# Patient Record
Sex: MALE | Race: WHITE | NOT HISPANIC OR LATINO | Employment: FULL TIME | URBAN - METROPOLITAN AREA
[De-identification: names, ages, dates, MRNs, and addresses within clinical notes are randomized per-mention and may not be internally consistent; named-entity substitution may affect disease eponyms.]

---

## 2018-11-30 ENCOUNTER — LAB (OUTPATIENT)
Dept: LAB | Facility: HOSPITAL | Age: 48
End: 2018-11-30

## 2018-11-30 ENCOUNTER — TRANSCRIBE ORDERS (OUTPATIENT)
Dept: LAB | Facility: HOSPITAL | Age: 48
End: 2018-11-30

## 2018-11-30 DIAGNOSIS — L40.50 PSORIATIC ARTHROPATHY (HCC): ICD-10-CM

## 2018-11-30 DIAGNOSIS — Z22.7 INACTIVE TUBERCULOSIS: ICD-10-CM

## 2018-11-30 DIAGNOSIS — R76.12 NONSPECIFIC REACTION TO CELL MEDIATED IMMUNITY MEASUREMENT OF GAMMA INTERFERON ANTIGEN RESPONSE WITHOUT ACTIVE TUBERCULOSIS: Primary | ICD-10-CM

## 2018-11-30 DIAGNOSIS — R76.12 NONSPECIFIC REACTION TO CELL MEDIATED IMMUNITY MEASUREMENT OF GAMMA INTERFERON ANTIGEN RESPONSE WITHOUT ACTIVE TUBERCULOSIS: ICD-10-CM

## 2018-11-30 LAB
ALBUMIN SERPL-MCNC: 4.52 G/DL (ref 3.2–4.8)
ALBUMIN/GLOB SERPL: 2.3 G/DL (ref 1.5–2.5)
ALP SERPL-CCNC: 76 U/L (ref 25–100)
ALT SERPL W P-5'-P-CCNC: 25 U/L (ref 7–40)
ANION GAP SERPL CALCULATED.3IONS-SCNC: 5 MMOL/L (ref 3–11)
AST SERPL-CCNC: 21 U/L (ref 0–33)
BASOPHILS # BLD AUTO: 0.1 10*3/MM3 (ref 0–0.2)
BASOPHILS NFR BLD AUTO: 1.5 % (ref 0–1)
BILIRUB SERPL-MCNC: 0.4 MG/DL (ref 0.3–1.2)
BUN BLD-MCNC: 9 MG/DL (ref 9–23)
BUN/CREAT SERPL: 10.6 (ref 7–25)
CALCIUM SPEC-SCNC: 9 MG/DL (ref 8.7–10.4)
CHLORIDE SERPL-SCNC: 100 MMOL/L (ref 99–109)
CO2 SERPL-SCNC: 29 MMOL/L (ref 20–31)
CREAT BLD-MCNC: 0.85 MG/DL (ref 0.6–1.3)
DEPRECATED RDW RBC AUTO: 45.6 FL (ref 37–54)
EOSINOPHIL # BLD AUTO: 0.24 10*3/MM3 (ref 0–0.3)
EOSINOPHIL NFR BLD AUTO: 3.7 % (ref 0–3)
ERYTHROCYTE [DISTWIDTH] IN BLOOD BY AUTOMATED COUNT: 15.4 % (ref 11.3–14.5)
GFR SERPL CREATININE-BSD FRML MDRD: 95 ML/MIN/1.73
GLOBULIN UR ELPH-MCNC: 2 GM/DL
GLUCOSE BLD-MCNC: 110 MG/DL (ref 70–100)
HCT VFR BLD AUTO: 38.4 % (ref 38.9–50.9)
HGB BLD-MCNC: 11.8 G/DL (ref 13.1–17.5)
IMM GRANULOCYTES # BLD: 0.01 10*3/MM3 (ref 0–0.03)
IMM GRANULOCYTES NFR BLD: 0.2 % (ref 0–0.6)
LYMPHOCYTES # BLD AUTO: 2.15 10*3/MM3 (ref 0.6–4.8)
LYMPHOCYTES NFR BLD AUTO: 33 % (ref 24–44)
MCH RBC QN AUTO: 24.9 PG (ref 27–31)
MCHC RBC AUTO-ENTMCNC: 30.7 G/DL (ref 32–36)
MCV RBC AUTO: 81.2 FL (ref 80–99)
MONOCYTES # BLD AUTO: 0.52 10*3/MM3 (ref 0–1)
MONOCYTES NFR BLD AUTO: 8 % (ref 0–12)
NEUTROPHILS # BLD AUTO: 3.5 10*3/MM3 (ref 1.5–8.3)
NEUTROPHILS NFR BLD AUTO: 53.6 % (ref 41–71)
PLATELET # BLD AUTO: 304 10*3/MM3 (ref 150–450)
PMV BLD AUTO: 10.7 FL (ref 6–12)
POTASSIUM BLD-SCNC: 4.2 MMOL/L (ref 3.5–5.5)
PROT SERPL-MCNC: 6.5 G/DL (ref 5.7–8.2)
RBC # BLD AUTO: 4.73 10*6/MM3 (ref 4.2–5.76)
SODIUM BLD-SCNC: 134 MMOL/L (ref 132–146)
WBC NRBC COR # BLD: 6.52 10*3/MM3 (ref 3.5–10.8)

## 2018-11-30 PROCEDURE — 85025 COMPLETE CBC W/AUTO DIFF WBC: CPT

## 2018-11-30 PROCEDURE — 80053 COMPREHEN METABOLIC PANEL: CPT

## 2018-11-30 PROCEDURE — 36415 COLL VENOUS BLD VENIPUNCTURE: CPT

## 2019-01-04 ENCOUNTER — TRANSCRIBE ORDERS (OUTPATIENT)
Dept: LAB | Facility: HOSPITAL | Age: 49
End: 2019-01-04

## 2019-01-04 ENCOUNTER — LAB (OUTPATIENT)
Dept: LAB | Facility: HOSPITAL | Age: 49
End: 2019-01-04

## 2019-01-04 DIAGNOSIS — R76.12 NONSPECIFIC REACTION TO CELL MEDIATED IMMUNITY MEASUREMENT OF GAMMA INTERFERON ANTIGEN RESPONSE WITHOUT ACTIVE TUBERCULOSIS: ICD-10-CM

## 2019-01-04 DIAGNOSIS — L40.50 PSORIATIC ARTHROPATHY (HCC): ICD-10-CM

## 2019-01-04 DIAGNOSIS — Z22.7 INACTIVE TUBERCULOSIS: ICD-10-CM

## 2019-01-04 DIAGNOSIS — D89.89 RADIATION CHIMERA (HCC): ICD-10-CM

## 2019-01-04 DIAGNOSIS — D89.89 RADIATION CHIMERA (HCC): Primary | ICD-10-CM

## 2019-01-04 LAB
ALBUMIN SERPL-MCNC: 4.27 G/DL (ref 3.2–4.8)
ALBUMIN/GLOB SERPL: 2.2 G/DL (ref 1.5–2.5)
ALP SERPL-CCNC: 66 U/L (ref 25–100)
ALT SERPL W P-5'-P-CCNC: 17 U/L (ref 7–40)
ANION GAP SERPL CALCULATED.3IONS-SCNC: 2 MMOL/L (ref 3–11)
AST SERPL-CCNC: 17 U/L (ref 0–33)
BASOPHILS # BLD AUTO: 0.1 10*3/MM3 (ref 0–0.2)
BASOPHILS NFR BLD AUTO: 1.7 % (ref 0–1)
BILIRUB SERPL-MCNC: 0.2 MG/DL (ref 0.3–1.2)
BUN BLD-MCNC: 10 MG/DL (ref 9–23)
BUN/CREAT SERPL: 11.1 (ref 7–25)
CALCIUM SPEC-SCNC: 8.5 MG/DL (ref 8.7–10.4)
CHLORIDE SERPL-SCNC: 107 MMOL/L (ref 99–109)
CO2 SERPL-SCNC: 29 MMOL/L (ref 20–31)
CREAT BLD-MCNC: 0.9 MG/DL (ref 0.6–1.3)
DEPRECATED RDW RBC AUTO: 47.3 FL (ref 37–54)
EOSINOPHIL # BLD AUTO: 0.24 10*3/MM3 (ref 0–0.3)
EOSINOPHIL NFR BLD AUTO: 4 % (ref 0–3)
ERYTHROCYTE [DISTWIDTH] IN BLOOD BY AUTOMATED COUNT: 15.5 % (ref 11.3–14.5)
GFR SERPL CREATININE-BSD FRML MDRD: 89 ML/MIN/1.73
GLOBULIN UR ELPH-MCNC: 1.9 GM/DL
GLUCOSE BLD-MCNC: 112 MG/DL (ref 70–100)
HCT VFR BLD AUTO: 37.3 % (ref 38.9–50.9)
HGB BLD-MCNC: 11.3 G/DL (ref 13.1–17.5)
IMM GRANULOCYTES # BLD AUTO: 0 10*3/MM3 (ref 0–0.03)
IMM GRANULOCYTES NFR BLD AUTO: 0 % (ref 0–0.6)
LYMPHOCYTES # BLD AUTO: 2.29 10*3/MM3 (ref 0.6–4.8)
LYMPHOCYTES NFR BLD AUTO: 38.4 % (ref 24–44)
MCH RBC QN AUTO: 25.2 PG (ref 27–31)
MCHC RBC AUTO-ENTMCNC: 30.3 G/DL (ref 32–36)
MCV RBC AUTO: 83.1 FL (ref 80–99)
MONOCYTES # BLD AUTO: 0.5 10*3/MM3 (ref 0–1)
MONOCYTES NFR BLD AUTO: 8.4 % (ref 0–12)
NEUTROPHILS # BLD AUTO: 2.84 10*3/MM3 (ref 1.5–8.3)
NEUTROPHILS NFR BLD AUTO: 47.5 % (ref 41–71)
PLATELET # BLD AUTO: 294 10*3/MM3 (ref 150–450)
PMV BLD AUTO: 10.5 FL (ref 6–12)
POTASSIUM BLD-SCNC: 4.4 MMOL/L (ref 3.5–5.5)
PROT SERPL-MCNC: 6.2 G/DL (ref 5.7–8.2)
RBC # BLD AUTO: 4.49 10*6/MM3 (ref 4.2–5.76)
SODIUM BLD-SCNC: 138 MMOL/L (ref 132–146)
WBC NRBC COR # BLD: 5.97 10*3/MM3 (ref 3.5–10.8)

## 2019-01-04 PROCEDURE — 36415 COLL VENOUS BLD VENIPUNCTURE: CPT

## 2019-01-04 PROCEDURE — 80053 COMPREHEN METABOLIC PANEL: CPT

## 2019-01-04 PROCEDURE — 85025 COMPLETE CBC W/AUTO DIFF WBC: CPT

## 2019-02-11 ENCOUNTER — TRANSCRIBE ORDERS (OUTPATIENT)
Dept: LAB | Facility: HOSPITAL | Age: 49
End: 2019-02-11

## 2019-02-11 ENCOUNTER — LAB (OUTPATIENT)
Dept: LAB | Facility: HOSPITAL | Age: 49
End: 2019-02-11

## 2019-02-11 DIAGNOSIS — L40.50 PSORIATIC ARTHROPATHY (HCC): ICD-10-CM

## 2019-02-11 DIAGNOSIS — Z22.7 INACTIVE TUBERCULOSIS: ICD-10-CM

## 2019-02-11 DIAGNOSIS — R76.12 NONSPECIFIC REACTION TO CELL MEDIATED IMMUNITY MEASUREMENT OF GAMMA INTERFERON ANTIGEN RESPONSE WITHOUT ACTIVE TUBERCULOSIS: ICD-10-CM

## 2019-02-11 DIAGNOSIS — D89.89 RADIATION CHIMERA (HCC): ICD-10-CM

## 2019-02-11 DIAGNOSIS — D89.89 RADIATION CHIMERA (HCC): Primary | ICD-10-CM

## 2019-02-11 LAB
ALBUMIN SERPL-MCNC: 4.38 G/DL (ref 3.2–4.8)
ALP SERPL-CCNC: 75 U/L (ref 25–100)
ALT SERPL W P-5'-P-CCNC: 15 U/L (ref 7–40)
AST SERPL-CCNC: 17 U/L (ref 0–33)
BASOPHILS # BLD AUTO: 0.11 10*3/MM3 (ref 0–0.2)
BASOPHILS NFR BLD AUTO: 1.8 % (ref 0–1)
BILIRUB CONJ SERPL-MCNC: 0.1 MG/DL (ref 0–0.2)
BILIRUB INDIRECT SERPL-MCNC: 0.2 MG/DL (ref 0.1–1.1)
BILIRUB SERPL-MCNC: 0.3 MG/DL (ref 0.3–1.2)
DEPRECATED RDW RBC AUTO: 45.9 FL (ref 37–54)
EOSINOPHIL # BLD AUTO: 0.34 10*3/MM3 (ref 0–0.3)
EOSINOPHIL NFR BLD AUTO: 5.6 % (ref 0–3)
ERYTHROCYTE [DISTWIDTH] IN BLOOD BY AUTOMATED COUNT: 15.4 % (ref 11.3–14.5)
HCT VFR BLD AUTO: 38.2 % (ref 38.9–50.9)
HGB BLD-MCNC: 11.6 G/DL (ref 13.1–17.5)
IMM GRANULOCYTES # BLD AUTO: 0 10*3/MM3 (ref 0–0.05)
IMM GRANULOCYTES NFR BLD AUTO: 0 % (ref 0–0.6)
LYMPHOCYTES # BLD AUTO: 2.44 10*3/MM3 (ref 0.6–4.8)
LYMPHOCYTES NFR BLD AUTO: 40 % (ref 24–44)
MCH RBC QN AUTO: 24.9 PG (ref 27–31)
MCHC RBC AUTO-ENTMCNC: 30.4 G/DL (ref 32–36)
MCV RBC AUTO: 82.2 FL (ref 80–99)
MONOCYTES # BLD AUTO: 0.4 10*3/MM3 (ref 0–1)
MONOCYTES NFR BLD AUTO: 6.6 % (ref 0–12)
NEUTROPHILS # BLD AUTO: 2.81 10*3/MM3 (ref 1.5–8.3)
NEUTROPHILS NFR BLD AUTO: 46 % (ref 41–71)
PLATELET # BLD AUTO: 316 10*3/MM3 (ref 150–450)
PMV BLD AUTO: 10.5 FL (ref 6–12)
PROT SERPL-MCNC: 6.7 G/DL (ref 5.7–8.2)
RBC # BLD AUTO: 4.65 10*6/MM3 (ref 4.2–5.76)
WBC NRBC COR # BLD: 6.1 10*3/MM3 (ref 3.5–10.8)

## 2019-02-11 PROCEDURE — 80076 HEPATIC FUNCTION PANEL: CPT

## 2019-02-11 PROCEDURE — 36415 COLL VENOUS BLD VENIPUNCTURE: CPT

## 2019-02-11 PROCEDURE — 85025 COMPLETE CBC W/AUTO DIFF WBC: CPT

## 2019-03-06 ENCOUNTER — LAB (OUTPATIENT)
Dept: LAB | Facility: HOSPITAL | Age: 49
End: 2019-03-06

## 2019-03-06 ENCOUNTER — TRANSCRIBE ORDERS (OUTPATIENT)
Dept: LAB | Facility: HOSPITAL | Age: 49
End: 2019-03-06

## 2019-03-06 DIAGNOSIS — Z22.7 INACTIVE TUBERCULOSIS: ICD-10-CM

## 2019-03-06 DIAGNOSIS — D89.89 RADIATION CHIMERA (HCC): ICD-10-CM

## 2019-03-06 DIAGNOSIS — L40.50 PSORIATIC ARTHROPATHY (HCC): ICD-10-CM

## 2019-03-06 DIAGNOSIS — D89.89 RADIATION CHIMERA (HCC): Primary | ICD-10-CM

## 2019-03-06 LAB
ALBUMIN SERPL-MCNC: 4.14 G/DL (ref 3.2–4.8)
ALBUMIN/GLOB SERPL: 2.1 G/DL (ref 1.5–2.5)
ALP SERPL-CCNC: 62 U/L (ref 25–100)
ALT SERPL W P-5'-P-CCNC: 16 U/L (ref 7–40)
ANION GAP SERPL CALCULATED.3IONS-SCNC: 8 MMOL/L (ref 3–11)
AST SERPL-CCNC: 19 U/L (ref 0–33)
BASOPHILS # BLD AUTO: 0.07 10*3/MM3 (ref 0–0.2)
BASOPHILS NFR BLD AUTO: 1.2 % (ref 0–1)
BILIRUB SERPL-MCNC: 0.4 MG/DL (ref 0.3–1.2)
BUN BLD-MCNC: 9 MG/DL (ref 9–23)
BUN/CREAT SERPL: 12.3 (ref 7–25)
CALCIUM SPEC-SCNC: 8.4 MG/DL (ref 8.7–10.4)
CHLORIDE SERPL-SCNC: 107 MMOL/L (ref 99–109)
CO2 SERPL-SCNC: 24 MMOL/L (ref 20–31)
CREAT BLD-MCNC: 0.73 MG/DL (ref 0.6–1.3)
DEPRECATED RDW RBC AUTO: 47.1 FL (ref 37–54)
EOSINOPHIL # BLD AUTO: 0.24 10*3/MM3 (ref 0–0.3)
EOSINOPHIL NFR BLD AUTO: 4.1 % (ref 0–3)
ERYTHROCYTE [DISTWIDTH] IN BLOOD BY AUTOMATED COUNT: 15.6 % (ref 11.3–14.5)
GFR SERPL CREATININE-BSD FRML MDRD: 114 ML/MIN/1.73
GLOBULIN UR ELPH-MCNC: 2 GM/DL
GLUCOSE BLD-MCNC: 191 MG/DL (ref 70–100)
HCT VFR BLD AUTO: 36.4 % (ref 38.9–50.9)
HGB BLD-MCNC: 11.3 G/DL (ref 13.1–17.5)
IMM GRANULOCYTES # BLD AUTO: 0.01 10*3/MM3 (ref 0–0.05)
IMM GRANULOCYTES NFR BLD AUTO: 0.2 % (ref 0–0.6)
LYMPHOCYTES # BLD AUTO: 2.93 10*3/MM3 (ref 0.6–4.8)
LYMPHOCYTES NFR BLD AUTO: 50.1 % (ref 24–44)
MCH RBC QN AUTO: 25.8 PG (ref 27–31)
MCHC RBC AUTO-ENTMCNC: 31 G/DL (ref 32–36)
MCV RBC AUTO: 83.1 FL (ref 80–99)
MONOCYTES # BLD AUTO: 0.34 10*3/MM3 (ref 0–1)
MONOCYTES NFR BLD AUTO: 5.8 % (ref 0–12)
NEUTROPHILS # BLD AUTO: 2.26 10*3/MM3 (ref 1.5–8.3)
NEUTROPHILS NFR BLD AUTO: 38.6 % (ref 41–71)
PLATELET # BLD AUTO: 273 10*3/MM3 (ref 150–450)
PMV BLD AUTO: 10.2 FL (ref 6–12)
POTASSIUM BLD-SCNC: 4.1 MMOL/L (ref 3.5–5.5)
PROT SERPL-MCNC: 6.1 G/DL (ref 5.7–8.2)
RBC # BLD AUTO: 4.38 10*6/MM3 (ref 4.2–5.76)
SODIUM BLD-SCNC: 139 MMOL/L (ref 132–146)
WBC NRBC COR # BLD: 5.85 10*3/MM3 (ref 3.5–10.8)

## 2019-03-06 PROCEDURE — 36415 COLL VENOUS BLD VENIPUNCTURE: CPT

## 2019-03-06 PROCEDURE — 85025 COMPLETE CBC W/AUTO DIFF WBC: CPT

## 2019-03-06 PROCEDURE — 80053 COMPREHEN METABOLIC PANEL: CPT

## 2022-10-04 ENCOUNTER — TELEPHONE (OUTPATIENT)
Dept: FAMILY MEDICINE CLINIC | Facility: CLINIC | Age: 52
End: 2022-10-04

## 2022-10-04 DIAGNOSIS — N18.2 TYPE 2 DIABETES MELLITUS WITH STAGE 2 CHRONIC KIDNEY DISEASE, WITHOUT LONG-TERM CURRENT USE OF INSULIN: Primary | ICD-10-CM

## 2022-10-04 DIAGNOSIS — E53.8 FOLIC ACID DEFICIENCY: ICD-10-CM

## 2022-10-04 DIAGNOSIS — E11.22 TYPE 2 DIABETES MELLITUS WITH STAGE 2 CHRONIC KIDNEY DISEASE, WITHOUT LONG-TERM CURRENT USE OF INSULIN: Primary | ICD-10-CM

## 2022-10-04 DIAGNOSIS — E53.8 B12 DEFICIENCY: ICD-10-CM

## 2022-10-04 DIAGNOSIS — E55.9 VITAMIN D INSUFFICIENCY: ICD-10-CM

## 2022-10-04 DIAGNOSIS — E78.2 MIXED HYPERLIPIDEMIA: ICD-10-CM

## 2022-10-04 PROBLEM — L40.9 PSORIASIS: Status: ACTIVE | Noted: 2022-10-04

## 2022-10-04 PROBLEM — L40.50 PSORIATIC ARTHRITIS (HCC): Status: ACTIVE | Noted: 2022-10-04

## 2022-10-04 RX ORDER — OMEPRAZOLE 40 MG/1
40 CAPSULE, DELAYED RELEASE ORAL DAILY
COMMUNITY
Start: 2022-08-22 | End: 2023-01-16 | Stop reason: SDUPTHER

## 2022-10-04 RX ORDER — MELOXICAM 15 MG/1
1 TABLET ORAL DAILY
COMMUNITY
Start: 2022-05-12

## 2022-10-04 RX ORDER — ADALIMUMAB 40MG/0.4ML
KIT SUBCUTANEOUS
COMMUNITY
Start: 2022-05-12

## 2022-10-04 RX ORDER — LEFLUNOMIDE 20 MG/1
1 TABLET ORAL DAILY
COMMUNITY
Start: 2022-05-12

## 2022-10-04 RX ORDER — METFORMIN HYDROCHLORIDE 500 MG/1
TABLET, EXTENDED RELEASE ORAL
COMMUNITY
Start: 2022-07-21 | End: 2022-10-07

## 2022-10-04 RX ORDER — LOSARTAN POTASSIUM 50 MG/1
TABLET ORAL
COMMUNITY
Start: 2022-07-21

## 2022-10-04 RX ORDER — ATORVASTATIN CALCIUM 80 MG/1
80 TABLET, FILM COATED ORAL NIGHTLY
COMMUNITY
Start: 2022-07-21

## 2022-10-04 NOTE — TELEPHONE ENCOUNTER
Caller: Derek Hernandez    Relationship: Self    Best call back number: [phone]    What orders are you requesting (i.e. lab or imaging): LAB ORDERS    In what timeframe would the patient need to come in: TODAY    Additional notes: PATIENT NEEDS LAB ORDERS PRIOR TO APPOINTMENT ON 10/06/2022. PLEASE CALL PATIENT WHEN ORDERS HAVE BEEN PLACED

## 2022-10-05 PROBLEM — D50.8 OTHER IRON DEFICIENCY ANEMIAS: Status: ACTIVE | Noted: 2022-10-05

## 2022-10-05 PROBLEM — I49.8 ATRIAL BIGEMINY: Status: ACTIVE | Noted: 2022-10-05

## 2022-10-05 PROBLEM — G47.33 OSA (OBSTRUCTIVE SLEEP APNEA): Status: ACTIVE | Noted: 2022-10-05

## 2022-10-05 PROBLEM — I10 HYPERTENSION: Status: ACTIVE | Noted: 2022-10-05

## 2022-10-05 PROBLEM — E11.29 TYPE 2 DIABETES MELLITUS WITH OTHER DIABETIC KIDNEY COMPLICATION: Status: ACTIVE | Noted: 2022-10-05

## 2022-10-05 PROBLEM — E78.5 DYSLIPIDEMIA: Status: ACTIVE | Noted: 2022-10-05

## 2022-10-05 PROBLEM — K75.81 STEATOHEPATITIS: Status: ACTIVE | Noted: 2022-10-05

## 2022-10-05 PROBLEM — E66.01 MORBID OBESITY (HCC): Status: ACTIVE | Noted: 2022-10-05

## 2022-10-05 PROBLEM — E55.9 VITAMIN D DEFICIENCY, UNSPECIFIED: Status: ACTIVE | Noted: 2022-10-05

## 2022-10-05 PROBLEM — L40.53 PSORIATIC SPONDYLITIS: Status: ACTIVE | Noted: 2022-10-05

## 2022-10-05 PROBLEM — E53.8 DEFICIENCY OF OTHER SPECIFIED B GROUP VITAMINS: Status: ACTIVE | Noted: 2022-10-05

## 2022-10-06 ENCOUNTER — CLINICAL SUPPORT (OUTPATIENT)
Dept: FAMILY MEDICINE CLINIC | Facility: CLINIC | Age: 52
End: 2022-10-06

## 2022-10-06 DIAGNOSIS — E55.9 VITAMIN D INSUFFICIENCY: ICD-10-CM

## 2022-10-06 DIAGNOSIS — E53.8 B12 DEFICIENCY: ICD-10-CM

## 2022-10-06 DIAGNOSIS — E53.8 FOLIC ACID DEFICIENCY: ICD-10-CM

## 2022-10-06 DIAGNOSIS — E11.22 TYPE 2 DIABETES MELLITUS WITH STAGE 2 CHRONIC KIDNEY DISEASE, WITHOUT LONG-TERM CURRENT USE OF INSULIN: ICD-10-CM

## 2022-10-06 DIAGNOSIS — N18.2 TYPE 2 DIABETES MELLITUS WITH STAGE 2 CHRONIC KIDNEY DISEASE, WITHOUT LONG-TERM CURRENT USE OF INSULIN: ICD-10-CM

## 2022-10-06 DIAGNOSIS — E78.2 MIXED HYPERLIPIDEMIA: ICD-10-CM

## 2022-10-06 PROCEDURE — 36415 COLL VENOUS BLD VENIPUNCTURE: CPT | Performed by: INTERNAL MEDICINE

## 2022-10-06 NOTE — PROGRESS NOTES
Venipuncture Blood Specimen Collection  Venipuncture performed in left arm by Rosie Go MA with good hemostasis. Patient tolerated the procedure well without complications.   10/06/22   Rosie Go MA

## 2022-10-07 ENCOUNTER — TELEPHONE (OUTPATIENT)
Dept: FAMILY MEDICINE CLINIC | Facility: CLINIC | Age: 52
End: 2022-10-07

## 2022-10-07 DIAGNOSIS — E11.65 INADEQUATELY CONTROLLED DIABETES MELLITUS: Primary | ICD-10-CM

## 2022-10-07 LAB
25(OH)D3+25(OH)D2 SERPL-MCNC: 34.9 NG/ML (ref 30–100)
ALBUMIN SERPL-MCNC: 4.3 G/DL (ref 3.8–4.9)
ALBUMIN/GLOB SERPL: 1.9 {RATIO} (ref 1.2–2.2)
ALP SERPL-CCNC: 68 IU/L (ref 44–121)
ALT SERPL-CCNC: 27 IU/L (ref 0–44)
AST SERPL-CCNC: 28 IU/L (ref 0–40)
BILIRUB SERPL-MCNC: 0.4 MG/DL (ref 0–1.2)
BUN SERPL-MCNC: 12 MG/DL (ref 6–24)
BUN/CREAT SERPL: 14 (ref 9–20)
CALCIUM SERPL-MCNC: 8.7 MG/DL (ref 8.7–10.2)
CHLORIDE SERPL-SCNC: 105 MMOL/L (ref 96–106)
CHOLEST SERPL-MCNC: 113 MG/DL (ref 100–199)
CO2 SERPL-SCNC: 26 MMOL/L (ref 20–29)
CREAT SERPL-MCNC: 0.87 MG/DL (ref 0.76–1.27)
EGFRCR SERPLBLD CKD-EPI 2021: 103 ML/MIN/1.73
FOLATE SERPL-MCNC: 7.6 NG/ML
GLOBULIN SER CALC-MCNC: 2.3 G/DL (ref 1.5–4.5)
GLUCOSE SERPL-MCNC: 133 MG/DL (ref 70–99)
HBA1C MFR BLD: 7.1 % (ref 4.8–5.6)
HDLC SERPL-MCNC: 31 MG/DL
LDLC SERPL CALC-MCNC: 69 MG/DL (ref 0–99)
POTASSIUM SERPL-SCNC: 4.9 MMOL/L (ref 3.5–5.2)
PROT SERPL-MCNC: 6.6 G/DL (ref 6–8.5)
SODIUM SERPL-SCNC: 143 MMOL/L (ref 134–144)
TRIGL SERPL-MCNC: 55 MG/DL (ref 0–149)
VIT B12 SERPL-MCNC: 401 PG/ML (ref 232–1245)
VLDLC SERPL CALC-MCNC: 13 MG/DL (ref 5–40)

## 2022-10-07 RX ORDER — METFORMIN HYDROCHLORIDE 500 MG/1
TABLET, EXTENDED RELEASE ORAL
Qty: 180 TABLET | Refills: 3 | Status: SHIPPED | OUTPATIENT
Start: 2022-10-07

## 2022-10-07 NOTE — TELEPHONE ENCOUNTER
Phone conversation with patient regarding recent lab testing including CMP unremarkable other than glucose 133, hemoglobin A1c 7.1%, B12 and folic acid normal, lipid profile excellent other than slightly low HDL of 31, vitamin D normal at 34.9.    Assessment/plan:  1.  Diabetes mellitus type 2.  Mildly suboptimal control.  Currently taking metformin  mg 1 tablet daily, which we will increase to 2 tablets daily, emphasizing need for healthy lifestyle with diet exercise and further attempts at weight loss.  Repeat hemoglobin A1c at his follow-up visit for complete physical in several months.  Advise if problems.  2.  Mixed dyslipidemia, excellent control other than slightly low HDL.  Continue current statin regimen, emphasizing healthy lifestyle.  3.  Remainder of laboratory testing satisfactory.  4.  Patient scheduled for complete physical on 2/6/2023.  5.  Patient acknowledged understanding of test results and is agreeable to the above plan of action.

## 2022-10-10 ENCOUNTER — OFFICE VISIT (OUTPATIENT)
Dept: FAMILY MEDICINE CLINIC | Facility: CLINIC | Age: 52
End: 2022-10-10

## 2022-10-10 VITALS
RESPIRATION RATE: 16 BRPM | WEIGHT: 242.2 LBS | HEIGHT: 68 IN | DIASTOLIC BLOOD PRESSURE: 97 MMHG | TEMPERATURE: 98.2 F | SYSTOLIC BLOOD PRESSURE: 164 MMHG | HEART RATE: 104 BPM | BODY MASS INDEX: 36.71 KG/M2 | OXYGEN SATURATION: 94 %

## 2022-10-10 DIAGNOSIS — R50.9 FEVER, UNSPECIFIED FEVER CAUSE: Primary | ICD-10-CM

## 2022-10-10 DIAGNOSIS — R31.9 URINARY TRACT INFECTION WITH HEMATURIA, SITE UNSPECIFIED: ICD-10-CM

## 2022-10-10 DIAGNOSIS — N39.0 URINARY TRACT INFECTION WITH HEMATURIA, SITE UNSPECIFIED: ICD-10-CM

## 2022-10-10 LAB
BILIRUB BLD-MCNC: ABNORMAL MG/DL
CLARITY, POC: ABNORMAL
COLOR UR: ABNORMAL
EXPIRATION DATE: NORMAL
FLUAV AG UPPER RESP QL IA.RAPID: NOT DETECTED
FLUBV AG UPPER RESP QL IA.RAPID: NOT DETECTED
GLUCOSE UR STRIP-MCNC: NEGATIVE MG/DL
INTERNAL CONTROL: NORMAL
KETONES UR QL: ABNORMAL
LEUKOCYTE EST, POC: ABNORMAL
Lab: NORMAL
NITRITE UR-MCNC: POSITIVE MG/ML
PH UR: 5.5 [PH] (ref 5–8)
PROT UR STRIP-MCNC: ABNORMAL MG/DL
RBC # UR STRIP: ABNORMAL /UL
SARS-COV-2 RNA RESP QL NAA+PROBE: NOT DETECTED
SP GR UR: 1.03 (ref 1–1.03)
UROBILINOGEN UR QL: ABNORMAL

## 2022-10-10 PROCEDURE — 99214 OFFICE O/P EST MOD 30 MIN: CPT | Performed by: INTERNAL MEDICINE

## 2022-10-10 PROCEDURE — 81002 URINALYSIS NONAUTO W/O SCOPE: CPT | Performed by: INTERNAL MEDICINE

## 2022-10-10 PROCEDURE — 87428 SARSCOV & INF VIR A&B AG IA: CPT | Performed by: INTERNAL MEDICINE

## 2022-10-10 RX ORDER — CEFDINIR 300 MG/1
300 CAPSULE ORAL 2 TIMES DAILY
Qty: 20 CAPSULE | Refills: 0 | Status: SHIPPED | OUTPATIENT
Start: 2022-10-10 | End: 2022-10-20

## 2022-10-10 NOTE — PROGRESS NOTES
"    Follow Up Office Visit      Date: 10/10/2022   Patient Name: Derek Hernandez  : 1970   MRN: [id number]     Chief Complaint:    Chief Complaint   Patient presents with   • Back Pain   • Fever       History of Present Illness: Derek Hernandez is a 52 y.o. male who is here today for 2-day history of chills, malaise, fever yesterday 102.9, 100 degrees earlier today, associate with headache, intermittent myalgias, no sore throat, no cough or cold symptoms, decreased appetite but no abdominal pain nausea vomiting diarrhea melena or hematochezia, has noted some increased frequency of urination the last day to the point he actually had 1 episode of incontinence, but no dysuria hematuria, mild low backache.  Went to an urgent treatment center yesterday where he had a cursory check per his account and had a negative flu and COVID swab.  Diabetic, blood sugars have been \"normal\".  Not aware of any ill contacts..  Has not had COVID-19 vaccine series.  Non-smoker.    Subjective      Review of Systems:   Review of Systems   HENT: Negative for congestion, ear pain and sore throat.    Respiratory: Negative for cough and wheezing.    Cardiovascular: Negative for chest pain.   Gastrointestinal: Negative for abdominal pain, diarrhea, nausea and vomiting.   Genitourinary: Negative for dysuria.   Skin: Negative for rash.       I have reviewed the patients family history, social history, past medical history, past surgical history and have updated it as appropriate.     Medications:     Current Outpatient Medications:   •  Adalimumab (Humira Pen) 40 MG/0.4ML Pen-injector Kit, INJECT 1 PEN SUBCUTANEOUSLY EVERY OTHER WEEK IN ABDOMEN OR THIGH (ROTATE INJECTION SITES), Disp: , Rfl:   •  atorvastatin (LIPITOR) 80 MG tablet, Take 80 mg by mouth Every Night., Disp: , Rfl:   •  leflunomide (ARAVA) 20 MG tablet, 1 tablet Daily., Disp: , Rfl:   •  losartan (COZAAR) 50 MG tablet, TAKE 1 TABLET BY MOUTH ONCE DAILY FOR BLOOD PRESSURE AND URINE " "PROTEIN, Disp: , Rfl:   •  meloxicam (MOBIC) 15 MG tablet, Take 1 tablet by mouth Daily., Disp: , Rfl:   •  metFORMIN ER (GLUCOPHAGE-XR) 500 MG 24 hr tablet, 2 tablets with evening meal, Disp: 180 tablet, Rfl: 3  •  omeprazole (priLOSEC) 40 MG capsule, Take 40 mg by mouth Daily., Disp: , Rfl:   •  cefdinir (OMNICEF) 300 MG capsule, Take 1 capsule by mouth 2 (Two) Times a Day for 10 days., Disp: 20 capsule, Rfl: 0    Allergies:   No Known Allergies    Objective     Physical Exam: Please see above  Vital Signs:   Vitals:    10/10/22 1721   BP: 164/97   BP Location: Left arm   Patient Position: Sitting   Cuff Size: Adult   Pulse: 104   Resp: 16   Temp: 98.2 °F (36.8 °C)   TempSrc: Temporal   SpO2: 94%   Weight: 110 kg (242 lb 3.2 oz)   Height: 172.7 cm (68\")     Body mass index is 36.83 kg/m².       Physical Exam  General: Pleasant 52-year-old white male who does not appear acutely ill, but is slightly clammy to the touch.  Accompanied by his wife intermittently.  Head and neck: TMs and canals bilaterally clear, nares clear, sinuses nontender, oropharynx is clear with moist membranes, good dentition, neck supple with no adenopathy masses or tenderness  Lungs are clear with no wheeze tachypnea or cough  Cardiac regular rate rhythm with no murmurs gallops or rubs  Abdomen mildly obese soft and nontender with no organomegaly or masses and normal bowel sounds  Back with no CVA tenderness to percussion   exam reveals normal circumcised male with testes descended bilaterally, no nodules or tenderness, no inguinal herniation or adenopathy  Rectal with normal sphincter tone, prostate not enlarged for age, no focal nodularity or tenderness no masses noted  Procedures    Results:   Labs:   Hemoglobin A1C   Date Value Ref Range Status   10/06/2022 7.1 (H) 4.8 - 5.6 % Final     Comment:              Prediabetes: 5.7 - 6.4           Diabetes: >6.4           Glycemic control for adults with diabetes: <7.0          POCT Results (if " applicable):   Results for orders placed or performed in visit on 10/10/22   Covid-19 + Flu A&B AG, Veritor    Specimen: Swab   Result Value Ref Range    COVID19 Not Detected Not Detected - Ref. Range    Influenza A Antigen MUNA Not Detected Not Detected    Influenza B Antigen MUNA Not Detected Not Detected    Internal Control Passed Passed    Lot Number [id number]     Expiration Date 11,062,022    POC Urinalysis Dipstick    Specimen: Urine   Result Value Ref Range    Color Orange (A) Yellow, Straw, Dark Yellow, Spring    Clarity, UA Cloudy (A) Clear    Glucose, UA Negative Negative mg/dL    Bilirubin Large (3+) (A) Negative    Ketones, UA 3+ (A) Negative    Specific Gravity  1.030 1.005 - 1.030    Blood, UA Small (A) Negative    pH, Urine 5.5 5.0 - 8.0    Protein, POC 3+ (A) Negative mg/dL    Urobilinogen, UA 2.0 E.U./dL (A) Normal, 0.2 E.U./dL    Leukocytes Small (1+) (A) Negative    Nitrite, UA Positive (A) Negative       Imaging:   No valid procedures specified.     Assessment / Plan      Assessment/Plan:   Diagnoses and all orders for this visit:    1. Fever, unspecified fever cause (Primary)  -     Covid-19 + Flu A&B AG, Veritor  -     POC Urinalysis Dipstick  -     Urine Culture - Urine, Urine, Clean Catch; Future  Rapid COVID-19 and flu screen both negative today, and also by history yesterday at urgent treatment center.  Clinically only focal symptoms is patient's urinary frequency, having some mild pyuria and trace hematuria with positive nitrates on his UA.  Possible UTI, focus uncertain.  Does not appear to have prostate tenderness.  Patient has no clinical history or signs consistent with pneumonia, GI source, or other focal abnormalities.  We will obtain a urine culture, treating in the interim with cefdinir as detailed below.  Patient understands that if he were to have any significant clinical decline that he is to present here or go to the ER for reassessment.  2. Urinary tract infection with hematuria,  site unspecified  -     cefdinir (OMNICEF) 300 MG capsule; Take 1 capsule by mouth 2 (Two) Times a Day for 10 days.  Dispense: 20 capsule; Refill: 0  Treat as above pending urine culture being finalized.    Follow Up:   Return for Annual physical.      At [facility], we believe that sharing information builds trust and better relationships. You are receiving this note because you recently visited [facility]. It is possible you will see health information before a provider has talked with you about it. This kind of information can be easy to misunderstand. To help you fully understand what it means for your health, we urge you to discuss this note with your provider.    Yahir Moore MD  [facility] [place]

## 2022-10-19 ENCOUNTER — TELEPHONE (OUTPATIENT)
Dept: FAMILY MEDICINE CLINIC | Facility: CLINIC | Age: 52
End: 2022-10-19

## 2022-10-19 NOTE — TELEPHONE ENCOUNTER
Phone conversation with patient in follow-up of his office visit on 10/10/2022, having had a history of some fever with some headaches myalgias and decreased appetite with some urinary frequency but no dysuria or hematuria.  His urinalysis in the office was abnormal and he was prescribed empirically some cefdinir pending culture result.  I have been out of town for the last week and just discussed the results of his urine cultures which revealed no growth at 48 hours.  Patient indicates that he is feeling back to normal.  Possibility would be a prostatitis that did not grow in the urine, versus urine contamination and other viral process.  Regardless, due to the possibility of a prostatitis with negative culture, he has been advised to complete his course of cefdinir and advise if any further problems.

## 2023-01-16 RX ORDER — OMEPRAZOLE 40 MG/1
40 CAPSULE, DELAYED RELEASE ORAL DAILY
Qty: 90 CAPSULE | Refills: 1 | Status: SHIPPED | OUTPATIENT
Start: 2023-01-16 | End: 2023-01-16 | Stop reason: SDUPTHER

## 2023-01-16 RX ORDER — OMEPRAZOLE 40 MG/1
40 CAPSULE, DELAYED RELEASE ORAL DAILY
Qty: 90 CAPSULE | Refills: 1 | Status: SHIPPED | OUTPATIENT
Start: 2023-01-16

## 2023-01-16 NOTE — TELEPHONE ENCOUNTER
[facility] CALLED STATING THAT THE PT IS TRANSFERRING TO THEM AND THEY NEED HIS OMEPRAZOLE 40MG SENT IN.

## 2023-08-16 RX ORDER — LOSARTAN POTASSIUM 50 MG/1
TABLET ORAL
Qty: 90 TABLET | Refills: 0 | Status: SHIPPED | OUTPATIENT
Start: 2023-08-16

## 2023-08-16 RX ORDER — ATORVASTATIN CALCIUM 80 MG/1
TABLET, FILM COATED ORAL
Qty: 90 TABLET | Refills: 0 | Status: SHIPPED | OUTPATIENT
Start: 2023-08-16

## 2023-09-07 ENCOUNTER — OFFICE VISIT (OUTPATIENT)
Dept: FAMILY MEDICINE CLINIC | Facility: CLINIC | Age: 53
End: 2023-09-07
Payer: COMMERCIAL

## 2023-09-07 VITALS
OXYGEN SATURATION: 96 % | BODY MASS INDEX: 37.65 KG/M2 | TEMPERATURE: 97.9 F | DIASTOLIC BLOOD PRESSURE: 84 MMHG | SYSTOLIC BLOOD PRESSURE: 142 MMHG | WEIGHT: 248.4 LBS | HEIGHT: 68 IN | HEART RATE: 87 BPM

## 2023-09-07 DIAGNOSIS — Z12.5 SCREENING FOR PROSTATE CANCER: ICD-10-CM

## 2023-09-07 DIAGNOSIS — R82.81 PYURIA: ICD-10-CM

## 2023-09-07 DIAGNOSIS — E66.8 MODERATE OBESITY: ICD-10-CM

## 2023-09-07 DIAGNOSIS — I10 PRIMARY HYPERTENSION: ICD-10-CM

## 2023-09-07 DIAGNOSIS — E11.29 TYPE 2 DIABETES MELLITUS WITH OTHER DIABETIC KIDNEY COMPLICATION: ICD-10-CM

## 2023-09-07 DIAGNOSIS — Z23 NEED FOR PROPHYLACTIC VACCINATION AGAINST STREPTOCOCCUS PNEUMONIAE (PNEUMOCOCCUS): ICD-10-CM

## 2023-09-07 DIAGNOSIS — Z13.29 SCREENING FOR THYROID DISORDER: ICD-10-CM

## 2023-09-07 DIAGNOSIS — E55.9 VITAMIN D DEFICIENCY: ICD-10-CM

## 2023-09-07 DIAGNOSIS — Z00.01 ENCOUNTER FOR GENERAL ADULT MEDICAL EXAMINATION WITH ABNORMAL FINDINGS: Primary | ICD-10-CM

## 2023-09-07 DIAGNOSIS — Z12.11 SCREEN FOR COLON CANCER: ICD-10-CM

## 2023-09-07 DIAGNOSIS — E78.5 DYSLIPIDEMIA: ICD-10-CM

## 2023-09-07 DIAGNOSIS — I49.8 ATRIAL BIGEMINY: ICD-10-CM

## 2023-09-07 DIAGNOSIS — L40.50 PSORIATIC ARTHRITIS: ICD-10-CM

## 2023-09-07 PROBLEM — R76.12 NONSPECIFIC REACTION TO CELL MEDIATED IMMUNITY MEASUREMENT OF GAMMA INTERFERON ANTIGEN RESPONSE WITHOUT ACTIVE TUBERCULOSIS: Status: ACTIVE | Noted: 2018-11-29

## 2023-09-07 PROBLEM — I47.20 VENTRICULAR TACHYCARDIA: Status: ACTIVE | Noted: 2019-04-01

## 2023-09-07 PROBLEM — E66.9 MODERATE OBESITY: Status: ACTIVE | Noted: 2022-10-05

## 2023-09-07 PROBLEM — D84.9 IMMUNOSUPPRESSION: Status: ACTIVE | Noted: 2018-11-30

## 2023-09-07 LAB
BILIRUB BLD-MCNC: ABNORMAL MG/DL
CLARITY, POC: CLEAR
COLOR UR: ABNORMAL
EXPIRATION DATE: NORMAL
GLUCOSE BLDC GLUCOMTR-MCNC: 182 MG/DL (ref 70–130)
GLUCOSE UR STRIP-MCNC: NEGATIVE MG/DL
HBA1C MFR BLD: 7.6 %
KETONES UR QL: ABNORMAL
LEUKOCYTE EST, POC: ABNORMAL
Lab: NORMAL
NITRITE UR-MCNC: NEGATIVE MG/ML
PH UR: 5.5 [PH] (ref 5–8)
PROT UR STRIP-MCNC: ABNORMAL MG/DL
RBC # UR STRIP: NEGATIVE /UL
SP GR UR: 1.03 (ref 1–1.03)
UROBILINOGEN UR QL: ABNORMAL

## 2023-09-07 RX ORDER — ASPIRIN 81 MG/1
81 TABLET ORAL DAILY
COMMUNITY

## 2023-09-07 RX ORDER — SEMAGLUTIDE 1.34 MG/ML
0.25 INJECTION, SOLUTION SUBCUTANEOUS WEEKLY
Qty: 1.5 ML | Refills: 0 | Status: SHIPPED | OUTPATIENT
Start: 2023-09-07
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
## 2023-09-07 NOTE — PROGRESS NOTES
Venipuncture Blood Specimen Collection  Venipuncture performed in left arm by Rosie Go MA with good hemostasis. Patient tolerated the procedure well without complications.   09/07/23   Rosie Go MA

## 2023-09-08 ENCOUNTER — TELEPHONE (OUTPATIENT)
Dept: FAMILY MEDICINE CLINIC | Facility: CLINIC | Age: 53
End: 2023-09-08
Payer: COMMERCIAL

## 2023-09-08 DIAGNOSIS — E78.5 DYSLIPIDEMIA: Primary | ICD-10-CM

## 2023-09-08 LAB
25(OH)D3+25(OH)D2 SERPL-MCNC: 22.6 NG/ML (ref 30–100)
ALBUMIN SERPL-MCNC: 4.5 G/DL (ref 3.8–4.9)
ALBUMIN/GLOB SERPL: 1.8 {RATIO} (ref 1.2–2.2)
ALP SERPL-CCNC: 79 IU/L (ref 44–121)
ALT SERPL-CCNC: 26 IU/L (ref 0–44)
AST SERPL-CCNC: 30 IU/L (ref 0–40)
BILIRUB SERPL-MCNC: 1.1 MG/DL (ref 0–1.2)
BUN SERPL-MCNC: 12 MG/DL (ref 6–24)
BUN/CREAT SERPL: 13 (ref 9–20)
CALCIUM SERPL-MCNC: 9.1 MG/DL (ref 8.7–10.2)
CHLORIDE SERPL-SCNC: 102 MMOL/L (ref 96–106)
CHOLEST SERPL-MCNC: 176 MG/DL (ref 100–199)
CO2 SERPL-SCNC: 20 MMOL/L (ref 20–29)
CREAT SERPL-MCNC: 0.95 MG/DL (ref 0.76–1.27)
CRP SERPL-MCNC: 2 MG/L (ref 0–10)
EGFRCR SERPLBLD CKD-EPI 2021: 95 ML/MIN/1.73
ERYTHROCYTE [DISTWIDTH] IN BLOOD BY AUTOMATED COUNT: 13.5 % (ref 11.6–15.4)
ERYTHROCYTE [SEDIMENTATION RATE] IN BLOOD BY WESTERGREN METHOD: 18 MM/HR (ref 0–30)
GLOBULIN SER CALC-MCNC: 2.5 G/DL (ref 1.5–4.5)
GLUCOSE SERPL-MCNC: 171 MG/DL (ref 70–99)
HCT VFR BLD AUTO: 45.7 % (ref 37.5–51)
HDLC SERPL-MCNC: 37 MG/DL
HGB BLD-MCNC: 15.2 G/DL (ref 13–17.7)
LDLC SERPL CALC-MCNC: 123 MG/DL (ref 0–99)
MCH RBC QN AUTO: 29.6 PG (ref 26.6–33)
MCHC RBC AUTO-ENTMCNC: 33.3 G/DL (ref 31.5–35.7)
MCV RBC AUTO: 89 FL (ref 79–97)
MICROALBUMIN UR-MCNC: 19.3 UG/ML
PLATELET # BLD AUTO: 227 X10E3/UL (ref 150–450)
POTASSIUM SERPL-SCNC: 4.7 MMOL/L (ref 3.5–5.2)
PROT SERPL-MCNC: 7 G/DL (ref 6–8.5)
PSA SERPL-MCNC: 0.8 NG/ML (ref 0–4)
RBC # BLD AUTO: 5.14 X10E6/UL (ref 4.14–5.8)
SODIUM SERPL-SCNC: 139 MMOL/L (ref 134–144)
T4 FREE SERPL-MCNC: 1.23 NG/DL (ref 0.82–1.77)
TRIGL SERPL-MCNC: 87 MG/DL (ref 0–149)
TSH SERPL DL<=0.005 MIU/L-ACNC: 5.23 UIU/ML (ref 0.45–4.5)
VLDLC SERPL CALC-MCNC: 16 MG/DL (ref 5–40)
WBC # BLD AUTO: 5 X10E3/UL (ref 3.4–10.8)

## 2023-09-08 NOTE — TELEPHONE ENCOUNTER
Reviewed test results from 9/7/2023 as follows:    Office POC urinalysis with trace protein, small leukocytes, negative blood, with culture with less than 10,000 colony counts of bacteria considered clinically insignificant  Office POC Hemoglobin A1c 7.6%  Sed rate 18, normal  CRP 2, normal  PSA normal at 0.8  Urine microalbumin normal at 19.3  Vitamin D low at 22.6  Total cholesterol 176, triglyceride 87, HDL 37,   CMP with elevated glucose of 171 and a known diabetic otherwise normal, specifically creatinine 0.95 with GFR 95  CBC normal noting specifically hemoglobin 15.2  TSH elevated at 5.23 with normal 0.45-4.50  Free T4 normal at 1.23 with normal 0.82-1.77    Assessment/plan:  1.  Diabetes mellitus type 2 with suboptimal control.  Patient taking metformin  mg 2 tablets daily and has been prescribed Ozempic yesterday.  Reassess in 2 months as discussed.  2.  Dyslipidemia suboptimal control.  Taking atorvastatin 80 mg nightly.  We will ensure compliance and if satisfactory, add Zetia 10 mg daily.  Repeat lipid profile in 2 months.  3.  History of psoriatic arthritis on immunosuppressives.  CRP and ESR normal.  4.  Subclinical hypothyroidism.  Repeat TSH and free T4 with next lab draw in 3 months.  5.  Vitamin D insufficiency.  Start vitamin D 1000 IU OTC.  6.  Remainder of lab testing satisfactory.  7.  Pyuria with urine culture growing insignificant colony count of less than 10,000 CFU.    9/11/2023:  The above test results and recommendations were discussed with patient.  Plan as above.

## 2023-09-10 LAB
BACTERIA UR CULT: NORMAL
BACTERIA UR CULT: NORMAL

## 2023-09-11 RX ORDER — EZETIMIBE 10 MG/1
10 TABLET ORAL DAILY
Qty: 90 TABLET | Refills: 3 | Status: SHIPPED | OUTPATIENT
Start: 2023-09-11 | End: 2023-09-15 | Stop reason: SDUPTHER

## 2023-09-11 RX ORDER — MELATONIN
1000 DAILY
COMMUNITY

## 2023-09-15 DIAGNOSIS — E78.5 DYSLIPIDEMIA: ICD-10-CM

## 2023-09-15 RX ORDER — EZETIMIBE 10 MG/1
10 TABLET ORAL DAILY
Qty: 90 TABLET | Refills: 3 | Status: SHIPPED | OUTPATIENT
Start: 2023-09-15

## 2023-10-20 RX ORDER — SEMAGLUTIDE 1.34 MG/ML
INJECTION, SOLUTION SUBCUTANEOUS
Qty: 3 ML | Refills: 0 | Status: SHIPPED | OUTPATIENT
Start: 2023-10-20

## 2023-11-22 ENCOUNTER — OFFICE VISIT (OUTPATIENT)
Dept: FAMILY MEDICINE CLINIC | Facility: CLINIC | Age: 53
End: 2023-11-22
Payer: COMMERCIAL

## 2023-11-22 ENCOUNTER — OFFICE VISIT (OUTPATIENT)
Dept: CARDIOLOGY | Facility: CLINIC | Age: 53
End: 2023-11-22
Payer: COMMERCIAL

## 2023-11-22 VITALS
BODY MASS INDEX: 37.65 KG/M2 | HEIGHT: 68 IN | SYSTOLIC BLOOD PRESSURE: 138 MMHG | OXYGEN SATURATION: 98 % | TEMPERATURE: 98.4 F | DIASTOLIC BLOOD PRESSURE: 90 MMHG | HEART RATE: 82 BPM | WEIGHT: 248.4 LBS

## 2023-11-22 VITALS
WEIGHT: 248 LBS | BODY MASS INDEX: 37.59 KG/M2 | SYSTOLIC BLOOD PRESSURE: 138 MMHG | DIASTOLIC BLOOD PRESSURE: 78 MMHG | HEIGHT: 68 IN | HEART RATE: 77 BPM | OXYGEN SATURATION: 98 %

## 2023-11-22 DIAGNOSIS — E66.8 MODERATE OBESITY: ICD-10-CM

## 2023-11-22 DIAGNOSIS — E11.29 TYPE 2 DIABETES MELLITUS WITH OTHER DIABETIC KIDNEY COMPLICATION: Primary | ICD-10-CM

## 2023-11-22 DIAGNOSIS — E78.5 DYSLIPIDEMIA: ICD-10-CM

## 2023-11-22 DIAGNOSIS — I10 PRIMARY HYPERTENSION: ICD-10-CM

## 2023-11-22 DIAGNOSIS — G47.33 OSA (OBSTRUCTIVE SLEEP APNEA): ICD-10-CM

## 2023-11-22 DIAGNOSIS — R94.31 ABNORMAL EKG: Primary | ICD-10-CM

## 2023-11-22 DIAGNOSIS — Z12.11 SCREEN FOR COLON CANCER: ICD-10-CM

## 2023-11-22 DIAGNOSIS — E03.8 SUBCLINICAL HYPOTHYROIDISM: ICD-10-CM

## 2023-11-22 DIAGNOSIS — Z23 INFLUENZA VACCINATION ADMINISTERED AT CURRENT VISIT: ICD-10-CM

## 2023-11-22 DIAGNOSIS — I49.8 ATRIAL BIGEMINY: ICD-10-CM

## 2023-11-22 LAB
EXPIRATION DATE: ABNORMAL
GLUCOSE BLDC GLUCOMTR-MCNC: 140 MG/DL (ref 70–130)
HBA1C MFR BLD: 7.1 % (ref 4.5–5.7)
Lab: ABNORMAL

## 2023-11-22 PROCEDURE — 99204 OFFICE O/P NEW MOD 45 MIN: CPT | Performed by: NURSE PRACTITIONER

## 2023-11-22 NOTE — ASSESSMENT & PLAN NOTE
History of TAMEKA that reportedly resolved after gastric bypass surgery, he has not used CPAP in over 13 years. He feels like he sleeps well and denies excessive daytime sleepiness or fatigue.  He declines a sleep study at this time

## 2023-11-22 NOTE — ASSESSMENT & PLAN NOTE
Hypertension is  stable .  Continue current treatment regimen.  Dietary sodium restriction.  Regular aerobic exercise.  Blood pressure will be reassessed at the next regular appointment.

## 2023-11-22 NOTE — ASSESSMENT & PLAN NOTE
Recent EKG completed by PCP revealed sinus rhythm with sinus arrhythmia, ST deviation and moderate T wave abnormality, possible lateral ischemia.  He was referred here for further evaluation.    - We will proceed with echocardiogram and stress test for further evaluation due to abnormal EKG and multiple cardiac risk factors, including hypertension, hyperlipidemia and diabetes.
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
## 2023-11-22 NOTE — PROGRESS NOTES
Answers submitted by the patient for this visit:  Primary Reason for Visit (Submitted on 11/15/2023)  What is the primary reason for your visit?: Other  Other (Submitted on 11/15/2023)  Please describe your symptoms.: Follow up  Have you had these symptoms before?: No  How long have you been having these symptoms?: 1-4 days      Follow Up Office Visit      Date: 2023   Patient Name: Derek Hernandez  : 1970   MRN: [id number]     Chief Complaint:    Chief Complaint   Patient presents with    Follow-up       History of Present Illness: Derek Hernandez is a 53 y.o. male who is here today for 2-month review, being a type II diabetic taking metformin 1000 mg daily, having had addition of Ozempic 2 months ago, currently in the midst of taking Ozempic 0.5 mg subcu weekly.  He does note significant decline in his appetite with no GI or other related symptoms, although his weight really has not changed on our scale, not checking blood sugars but hemoglobin A1c improved from 7.6% 2 months ago now to 7.1% today, moderate activity and moderate healthy diet.  Also has hypertension taking losartan 50 mg daily, not checking blood pressures but denying chest pains palpitations dyspnea dizziness or edema.  History of dyslipidemia, taking atorvastatin 80 mg daily and based upon lab testing 2 months ago also had the addition of Zetia 10 mg daily, due for repeat testing.  Had lab testing 2 months ago indicating subclinical hypothyroidism again due for recheck, obesity unchanged on the Ozempic thus far, and also atrial bigeminy for which she has cardiology appointment later this afternoon.  He did have a normal stress echocardiogram back in 2019.  Again denies any cardiac symptoms..    Subjective      Review of Systems:   Review of Systems    I have reviewed the patients family history, social history, past medical history, past surgical history and have updated it as appropriate.     Medications:     Current Outpatient  "Medications:     Adalimumab (Humira Pen) 40 MG/0.4ML Pen-injector Kit, INJECT 1 PEN SUBCUTANEOUSLY EVERY OTHER WEEK IN ABDOMEN OR THIGH (ROTATE INJECTION SITES), Disp: , Rfl:     aspirin 81 MG EC tablet, Take 1 tablet by mouth Daily., Disp: , Rfl:     atorvastatin (LIPITOR) 80 MG tablet, TAKE 1 TABLET BY MOUTH AT BEDTIME, Disp: 90 tablet, Rfl: 0    Cholecalciferol 25 MCG (1000 UT) tablet, Take 1 tablet by mouth Daily., Disp: , Rfl:     ezetimibe (Zetia) 10 MG tablet, Take 1 tablet by mouth Daily., Disp: 90 tablet, Rfl: 3    leflunomide (ARAVA) 20 MG tablet, 1 tablet Daily., Disp: , Rfl:     losartan (COZAAR) 50 MG tablet, TAKE 1 TABLET BY MOUTH EVERY DAY, Disp: 90 tablet, Rfl: 0    meloxicam (MOBIC) 15 MG tablet, Take 1 tablet by mouth Daily., Disp: , Rfl:     metFORMIN ER (GLUCOPHAGE-XR) 500 MG 24 hr tablet, 2 tablets with evening meal, Disp: 180 tablet, Rfl: 3    omeprazole (priLOSEC) 40 MG capsule, Take 1 capsule by mouth Daily., Disp: 90 capsule, Rfl: 1    Semaglutide, 1 MG/DOSE, (Ozempic, 1 MG/DOSE,) 4 MG/3ML solution pen-injector, Administer 0.5mg sub-q for 4 weeks, Disp: 3 mL, Rfl: 0    Allergies:   No Known Allergies    Objective     Physical Exam: Please see above  Vital Signs:   Vitals:    11/22/23 0852   BP: 138/90   BP Location: Right arm   Patient Position: Sitting   Cuff Size: Adult   Pulse: 82   Temp: 98.4 °F (36.9 °C)   TempSrc: Temporal   SpO2: 98%   Weight: 113 kg (248 lb 6.4 oz)   Height: 172.7 cm (68\")     Body mass index is 37.77 kg/m².          Physical Exam  General: Pleasant healthy appearing 53-year-old male who is in no acute distress.  Neck with no masses or tenderness  Lungs are clear with no wheeze tachypnea or cough  Cardiac regular rate rhythm with occasional pauses, no gallops or rubs, no dependent edema  Neurological exam grossly normal    Procedures    Results:   Labs:   Hemoglobin A1C   Date Value Ref Range Status   11/22/2023 7.1 (A) 4.5 - 5.7 % Final   07/08/2022 7.2  Final "     TSH   Date Value Ref Range Status   09/07/2023 5.230 (H) 0.450 - 4.500 uIU/mL Final        POCT Results (if applicable):   Results for orders placed or performed in visit on 11/22/23   POC Glycosylated Hemoglobin (Hb A1C)    Specimen: Blood   Result Value Ref Range    Hemoglobin A1C 7.1 (A) 4.5 - 5.7 %    Lot Number [id number]     Expiration Date 07/02/2025    POC Glucose    Specimen: Blood   Result Value Ref Range    Glucose 140 (A) 70 - 130 mg/dL       Assessment / Plan      Assessment/Plan:   Diagnoses and all orders for this visit:    1. Type 2 diabetes mellitus with other diabetic kidney complication (Primary)  -     POC Glycosylated Hemoglobin (Hb A1C)  -     POC Glucose  Taking metformin  mg at 2 tablets daily, and Ozempic currently at 0.5 mg subcu weekly for the last couple months, with no significant change in weight but has had improved glycemic control with a hemoglobin A1c today of 7.1% versus 7.6% 2 months prior.  Increase Ozempic to 1 mg subcu weekly.  Advised to start checking blood sugars several times weekly, and continue efforts at healthy lifestyle.  Repeat another hemoglobin A1c within the next several months.  Ensure diabetic eye evaluation updated yearly, note does take losartan for concomitant hypertension.  2. Primary hypertension  -     Comprehensive Metabolic Panel; Future  -     Comprehensive Metabolic Panel  Borderline blood pressure elevation acutely, chronic control unknown currently taking losartan 50 mg daily.  Advised to start checking blood pressures at least once or twice weekly with ideal parameters discussed, also encouraging healthy lifestyle with diet and exercise and avoidance of added salt.  3. Dyslipidemia  -     Lipid Panel; Future  -     Lipid Panel  Suboptimal lipid control on atorvastatin 80 mg daily based upon labs 2 months ago, subsequently having had the addition of Zetia 10 mg daily.  Update lipid profile.  4. Subclinical hypothyroidism  -     TSH;  Future  -     T4, Free; Future  -     T4, Free  -     TSH  Mildly elevated TSH with normal free T4 per labs 2 months ago.  Repeat testing.  5. Moderate obesity  Weight thus far unchanged having been taking Ozempic for the last 2 months.  Increasing Ozempic up to 1 mg subcu weekly as noted above for his diabetes, continuing healthy lifestyle efforts for weight loss.  6. Atrial bigeminy  Asymptomatic.  Patient to see cardiology today.  History of normal stress echocardiogram from 2019.  7. Screen for colon cancer  Patient previously given referral to Dr. Salmeron for colon cancer screening.  He is to contact Dr. Salmeron office to schedule this himself, advised to do so in the near future.  8. Influenza vaccination administered at current visit        -     Fluzone (or Fluarix & Flulaval for VFC) >6mos    Follow-up in 1 month to review clinical response to his Ozempic dosage adjustment.    Vaccine Counseling:  “Discussed risks/benefits to vaccination, reviewed components of the vaccine, discussed VIS, discussed informed consent, informed consent obtained. Patient/Parent was allowed to accept or refuse vaccine. Questions answered to satisfactory state of patient/Parent. We reviewed typical age appropriate and seasonally appropriate vaccinations. Reviewed immunization history and updated state vaccination form as needed. Patient was counseled on Influenza    Follow Up:   Return in about 1 month (around 12/22/2023) for Recheck.      At [facility], we believe that sharing information builds trust and better relationships. You are receiving this note because you recently visited [facility]. It is possible you will see health information before a provider has talked with you about it. This kind of information can be easy to misunderstand. To help you fully understand what it means for your health, we urge you to discuss this note with your provider.    Yahir Moore MD  [facility]

## 2023-11-22 NOTE — PROGRESS NOTES
Venipuncture Blood Specimen Collection  Venipuncture performed in left arm by Rosie Go MA with good hemostasis. Patient tolerated the procedure well without complications.   11/22/23   Rosie Go MA

## 2023-11-23 LAB
ALBUMIN SERPL-MCNC: 4.2 G/DL (ref 3.8–4.9)
ALBUMIN/GLOB SERPL: 1.6 {RATIO} (ref 1.2–2.2)
ALP SERPL-CCNC: 66 IU/L (ref 44–121)
ALT SERPL-CCNC: 31 IU/L (ref 0–44)
AST SERPL-CCNC: 28 IU/L (ref 0–40)
BILIRUB SERPL-MCNC: 0.6 MG/DL (ref 0–1.2)
BUN SERPL-MCNC: 9 MG/DL (ref 6–24)
BUN/CREAT SERPL: 10 (ref 9–20)
CALCIUM SERPL-MCNC: 8.7 MG/DL (ref 8.7–10.2)
CHLORIDE SERPL-SCNC: 103 MMOL/L (ref 96–106)
CHOLEST SERPL-MCNC: 159 MG/DL (ref 100–199)
CO2 SERPL-SCNC: 23 MMOL/L (ref 20–29)
CREAT SERPL-MCNC: 0.92 MG/DL (ref 0.76–1.27)
EGFRCR SERPLBLD CKD-EPI 2021: 98 ML/MIN/1.73
GLOBULIN SER CALC-MCNC: 2.6 G/DL (ref 1.5–4.5)
GLUCOSE SERPL-MCNC: 134 MG/DL (ref 70–99)
HDLC SERPL-MCNC: 35 MG/DL
LDLC SERPL CALC-MCNC: 110 MG/DL (ref 0–99)
POTASSIUM SERPL-SCNC: 4.8 MMOL/L (ref 3.5–5.2)
PROT SERPL-MCNC: 6.8 G/DL (ref 6–8.5)
SODIUM SERPL-SCNC: 140 MMOL/L (ref 134–144)
T4 FREE SERPL-MCNC: 1.12 NG/DL (ref 0.82–1.77)
TRIGL SERPL-MCNC: 72 MG/DL (ref 0–149)
TSH SERPL DL<=0.005 MIU/L-ACNC: 5.27 UIU/ML (ref 0.45–4.5)
VLDLC SERPL CALC-MCNC: 14 MG/DL (ref 5–40)

## 2023-11-27 ENCOUNTER — TELEPHONE (OUTPATIENT)
Dept: FAMILY MEDICINE CLINIC | Facility: CLINIC | Age: 53
End: 2023-11-27
Payer: COMMERCIAL

## 2023-11-27 DIAGNOSIS — E78.5 DYSLIPIDEMIA: Primary | ICD-10-CM

## 2023-11-27 RX ORDER — BEMPEDOIC ACID AND EZETIMIBE 180; 10 MG/1; MG/1
1 TABLET, FILM COATED ORAL DAILY
Qty: 90 TABLET | Refills: 3 | Status: SHIPPED | OUTPATIENT
Start: 2023-11-27

## 2023-11-27 NOTE — TELEPHONE ENCOUNTER
Phone conversation with patient regarding testing from 11/22/2023 as follows:    Total cholesterol 159, triglycerides 72, HDL 35,   CMP significant for glucose 134 otherwise completely normal including specifically creatinine 0.92 with GFR 98  TSH mildly elevated at 5.270, free T4 normal at 1.12    Assessment/plan:  1.  Dyslipidemia, suboptimal control taking atorvastatin 80 mg nightly with addition several months ago of Zetia 10 mg daily.  Given suboptimal control we will switch Zetia over to Nexlizet 1 tablet daily continue on the atorvastatin 80 mg nightly and repeat a lipid profile in 3 months.  Also pursue healthy lifestyle efforts.  If the Nexlizet is not covered then we will attempt a PA, if this is unsuccessful then continue his current regimen of atorvastatin and Zetia as prescribed.  2.  Subclinical hypothyroidism.  Stable.  Follow-up testing in 6 months.  3.  Remainder of laboratory testing satisfactory.

## 2023-11-28 DIAGNOSIS — E11.29 TYPE 2 DIABETES MELLITUS WITH OTHER DIABETIC KIDNEY COMPLICATION: Primary | ICD-10-CM

## 2023-11-30 DIAGNOSIS — E78.5 DYSLIPIDEMIA: ICD-10-CM

## 2023-11-30 DIAGNOSIS — I10 PRIMARY HYPERTENSION: Primary | ICD-10-CM

## 2023-11-30 DIAGNOSIS — K21.00 GASTROESOPHAGEAL REFLUX DISEASE WITH ESOPHAGITIS, UNSPECIFIED WHETHER HEMORRHAGE: ICD-10-CM

## 2023-11-30 RX ORDER — OMEPRAZOLE 40 MG/1
40 CAPSULE, DELAYED RELEASE ORAL DAILY
Qty: 90 CAPSULE | Refills: 1 | Status: SHIPPED | OUTPATIENT
Start: 2023-11-30

## 2023-11-30 RX ORDER — LOSARTAN POTASSIUM 50 MG/1
50 TABLET ORAL DAILY
Qty: 90 TABLET | Refills: 0 | Status: SHIPPED | OUTPATIENT
Start: 2023-11-30

## 2023-11-30 RX ORDER — ATORVASTATIN CALCIUM 80 MG/1
80 TABLET, FILM COATED ORAL
Qty: 90 TABLET | Refills: 0 | Status: SHIPPED | OUTPATIENT
Start: 2023-11-30

## 2024-01-08 DIAGNOSIS — E11.29 TYPE 2 DIABETES MELLITUS WITH OTHER DIABETIC KIDNEY COMPLICATION: ICD-10-CM

## 2024-01-08 RX ORDER — SEMAGLUTIDE 1.34 MG/ML
INJECTION, SOLUTION SUBCUTANEOUS
Qty: 3 ML | Refills: 1 | Status: SHIPPED | OUTPATIENT
Start: 2024-01-08

## 2024-01-29 ENCOUNTER — OFFICE VISIT (OUTPATIENT)
Dept: CARDIOLOGY | Facility: CLINIC | Age: 54
End: 2024-01-29
Payer: COMMERCIAL

## 2024-01-29 VITALS
BODY MASS INDEX: 36.98 KG/M2 | SYSTOLIC BLOOD PRESSURE: 152 MMHG | OXYGEN SATURATION: 93 % | WEIGHT: 244 LBS | HEART RATE: 94 BPM | DIASTOLIC BLOOD PRESSURE: 78 MMHG | HEIGHT: 68 IN

## 2024-01-29 DIAGNOSIS — R94.31 ABNORMAL EKG: Primary | ICD-10-CM

## 2024-01-29 DIAGNOSIS — G47.33 OSA (OBSTRUCTIVE SLEEP APNEA): ICD-10-CM

## 2024-01-29 PROCEDURE — 99214 OFFICE O/P EST MOD 30 MIN: CPT | Performed by: NURSE PRACTITIONER

## 2024-01-29 NOTE — ASSESSMENT & PLAN NOTE
Recent EKG revealed sinus rhythm with sinus arrhythmia, ST deviation and moderate T wave abnormality, possible lateral ischemia.   Cardiac workup was unremarkable.  Stress echo revealed a normal stress test consistent with low risk study for myocardial ischemia.   Echocardiogram revealed an LVEF 55%. Grade 1 diastolic dysfunction. RVSP is normal. Mild mitral regurgitation.
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
## 2024-03-01 DIAGNOSIS — E78.5 DYSLIPIDEMIA: ICD-10-CM

## 2024-03-01 DIAGNOSIS — E11.29 TYPE 2 DIABETES MELLITUS WITH OTHER DIABETIC KIDNEY COMPLICATION: ICD-10-CM

## 2024-03-01 DIAGNOSIS — I10 PRIMARY HYPERTENSION: ICD-10-CM

## 2024-03-01 RX ORDER — ATORVASTATIN CALCIUM 80 MG/1
80 TABLET, FILM COATED ORAL
Qty: 90 TABLET | Refills: 2 | Status: SHIPPED | OUTPATIENT
Start: 2024-03-01

## 2024-03-01 RX ORDER — LOSARTAN POTASSIUM 50 MG/1
50 TABLET ORAL DAILY
Qty: 90 TABLET | Refills: 2 | Status: SHIPPED | OUTPATIENT
Start: 2024-03-01

## 2024-03-01 RX ORDER — SEMAGLUTIDE 1.34 MG/ML
INJECTION, SOLUTION SUBCUTANEOUS
Qty: 3 ML | Refills: 3 | Status: SHIPPED | OUTPATIENT
Start: 2024-03-01

## 2024-04-10 RX ORDER — SEMAGLUTIDE 0.68 MG/ML
INJECTION, SOLUTION SUBCUTANEOUS
Qty: 3 ML | Refills: 0 | Status: SHIPPED | OUTPATIENT
Start: 2024-04-10

## 2024-04-11 PROBLEM — H90.5 SENSORINEURAL HEARING LOSS: Status: ACTIVE | Noted: 2023-04-19

## 2024-04-19 ENCOUNTER — OFFICE VISIT (OUTPATIENT)
Dept: FAMILY MEDICINE CLINIC | Facility: CLINIC | Age: 54
End: 2024-04-19
Payer: COMMERCIAL

## 2024-04-19 VITALS
HEART RATE: 62 BPM | WEIGHT: 242 LBS | SYSTOLIC BLOOD PRESSURE: 134 MMHG | TEMPERATURE: 98.1 F | OXYGEN SATURATION: 97 % | DIASTOLIC BLOOD PRESSURE: 82 MMHG | BODY MASS INDEX: 36.68 KG/M2 | HEIGHT: 68 IN

## 2024-04-19 DIAGNOSIS — E03.8 SUBCLINICAL HYPOTHYROIDISM: ICD-10-CM

## 2024-04-19 DIAGNOSIS — I10 PRIMARY HYPERTENSION: ICD-10-CM

## 2024-04-19 DIAGNOSIS — E78.5 DYSLIPIDEMIA: ICD-10-CM

## 2024-04-19 DIAGNOSIS — E11.29 TYPE 2 DIABETES MELLITUS WITH OTHER DIABETIC KIDNEY COMPLICATION: Primary | ICD-10-CM

## 2024-04-19 DIAGNOSIS — E66.8 MODERATE OBESITY: ICD-10-CM

## 2024-04-19 LAB
EXPIRATION DATE: ABNORMAL
EXPIRATION DATE: ABNORMAL
GLUCOSE BLDC GLUCOMTR-MCNC: 136 MG/DL (ref 70–130)
HBA1C MFR BLD: 6.3 % (ref 4.5–5.7)
Lab: ABNORMAL
Lab: ABNORMAL

## 2024-04-19 RX ORDER — SEMAGLUTIDE 2.68 MG/ML
2 INJECTION, SOLUTION SUBCUTANEOUS WEEKLY
Qty: 9 ML | Refills: 3 | Status: SHIPPED | OUTPATIENT
Start: 2024-04-19

## 2024-04-19 NOTE — PROGRESS NOTES
Venipuncture Blood Specimen Collection  Venipuncture performed in right arm by Xiomy Garcia MA with good hemostasis. Patient tolerated the procedure well without complications.   04/19/24   Xiomy Garcia MA

## 2024-04-19 NOTE — PROGRESS NOTES
Follow Up Office Visit      Date: 2024   Patient Name: Derek Hernandez  : 1970   MRN: [id number]     Chief Complaint:    Chief Complaint   Patient presents with    Follow-up       History of Present Illness: Derek Hernandez is a 53 y.o. male who is here today for routine follow-up, last visit 5 months ago.  Is a diabetic currently taking metformin XR 1000 mg daily and Ozempic 1 mg subcu weekly, hemoglobin A1c of 7.1% in 2023.  Does have room to improve both his diet and exercise.  Also hypertension on losartan with blood pressures 130s over 80s denying any chest pains palpitations dyspnea dizziness or edema, having had a recent cardiac workup in 2023 including a low risk stress echocardiogram.  Hyperlipidemia on Lipitor 80 mg nightly with addition of Nexlizet due for repeat lipid profile, obesity status post bariatric surgery, having lost 2 pounds in weight in last several months on the Ozempic 1 mg subcu weekly.  Subclinical hypothyroidism due for repeat testing, due for screening colonoscopy after recent negative cardiac workup..    Subjective      Review of Systems:   Review of Systems    I have reviewed the patients family history, social history, past medical history, past surgical history and have updated it as appropriate.     Medications:     Current Outpatient Medications:     Adalimumab (Humira Pen) 40 MG/0.4ML Pen-injector Kit, INJECT 1 PEN SUBCUTANEOUSLY EVERY OTHER WEEK IN ABDOMEN OR THIGH (ROTATE INJECTION SITES), Disp: , Rfl:     aspirin 81 MG EC tablet, Take 1 tablet by mouth Daily., Disp: , Rfl:     atorvastatin (LIPITOR) 80 MG tablet, TAKE 1 TABLET BY MOUTH AT BEDTIME, Disp: 90 tablet, Rfl: 2    Bempedoic Acid-Ezetimibe (Nexlizet) 180-10 MG tablet, Take 1 tablet by mouth Daily., Disp: 90 tablet, Rfl: 3    Cholecalciferol 25 MCG (1000 UT) tablet, Take 1 tablet by mouth Daily., Disp: , Rfl:     leflunomide (ARAVA) 20 MG tablet, 1 tablet Daily., Disp: , Rfl:     losartan (COZAAR) 50  "MG tablet, TAKE 1 TABLET BY MOUTH ONCE DAILY, Disp: 90 tablet, Rfl: 2    meloxicam (MOBIC) 15 MG tablet, Take 1 tablet by mouth Daily., Disp: , Rfl:     metFORMIN ER (GLUCOPHAGE-XR) 500 MG 24 hr tablet, 2 tablets with evening meal, Disp: 180 tablet, Rfl: 3    omeprazole (priLOSEC) 40 MG capsule, Take 1 capsule by mouth Daily., Disp: 90 capsule, Rfl: 1    Semaglutide, 2 MG/DOSE, (Ozempic, 2 MG/DOSE,) 8 MG/3ML solution pen-injector, Inject 2 mg under the skin into the appropriate area as directed 1 (One) Time Per Week., Disp: 9 mL, Rfl: 3    Allergies:   No Known Allergies    Objective     Physical Exam: Please see above  Vital Signs:   Vitals:    04/19/24 0827   BP: 134/82   BP Location: Left arm   Patient Position: Sitting   Cuff Size: Adult   Pulse: 62   Temp: 98.1 °F (36.7 °C)   TempSrc: Temporal   SpO2: 97%   Weight: 110 kg (242 lb)   Height: 172.7 cm (68\")     Body mass index is 36.8 kg/m².          Physical Exam  Constitutional:       General: He is not in acute distress.     Appearance: Normal appearance. He is obese. He is not ill-appearing.      Comments: Pleasant, healthy, NAD, BMI 36.8 reflecting 2 pound weight loss in the last 3 months   Cardiovascular:      Rate and Rhythm: Normal rate and regular rhythm.      Heart sounds: Normal heart sounds. No murmur heard.     No friction rub. No gallop.   Pulmonary:      Effort: Pulmonary effort is normal. No respiratory distress.      Breath sounds: Normal breath sounds.   Musculoskeletal:      Cervical back: No rigidity or tenderness.      Right lower leg: No edema.      Left lower leg: No edema.   Lymphadenopathy:      Cervical: No cervical adenopathy.   Neurological:      General: No focal deficit present.      Mental Status: He is oriented to person, place, and time. Mental status is at baseline.      Cranial Nerves: No cranial nerve deficit.      Sensory: No sensory deficit.      Motor: No weakness.      Coordination: Coordination normal.      Gait: Gait " normal.      Comments: Bipedal exam with 2+ DP and 2+ PT pulses, normal sensation of both feet fine touch vibration and pinprick motor exam normal, no skin breakdown     Diabetic Foot Exam Performed and Monofilament Test Performed     Procedures    Results:   Labs:   Hemoglobin A1C   Date Value Ref Range Status   04/19/2024 6.3 (A) 4.5 - 5.7 % Final   07/08/2022 7.2  Final     TSH   Date Value Ref Range Status   11/22/2023 5.270 (H) 0.450 - 4.500 uIU/mL Final        POCT Results (if applicable):   Results for orders placed or performed in visit on 04/19/24   POC Glycosylated Hemoglobin (Hb A1C)    Specimen: Blood   Result Value Ref Range    Hemoglobin A1C 6.3 (A) 4.5 - 5.7 %    Lot Number [id number]     Expiration Date 12/03/2025    POC Glucose, Blood    Specimen: Blood   Result Value Ref Range    Glucose 136 (A) 70 - 130 mg/dL    Lot Number [id number]     Expiration Date 06/30/2024        Imaging:   No valid procedures specified.       Assessment / Plan      Assessment/Plan:   Diagnoses and all orders for this visit:    1. Type 2 diabetes mellitus with other diabetic kidney complication (Primary)  Assessment & Plan:  Most recent hemoglobin A1c 7.1% in 11/2023 improved to 6.3% today, taking Ozempic currently 1 mg subcu weekly and metformin  mg at 2 tablets daily.  Increase Ozempic up to 2 mg subcu weekly, with patient to monitor blood sugars regularly, and if consistently dropping below 80 then he is to reduce his metformin  mg from 2 down to 1 tablet daily.  Hemoglobin A1c next visit.    Orders:  -     POC Glycosylated Hemoglobin (Hb A1C)  -     POC Glucose, Blood  -     Semaglutide, 2 MG/DOSE, (Ozempic, 2 MG/DOSE,) 8 MG/3ML solution pen-injector; Inject 2 mg under the skin into the appropriate area as directed 1 (One) Time Per Week.  Dispense: 9 mL; Refill: 3    2. Primary hypertension  Assessment & Plan:  Satisfactory blood pressure control acutely as well as chronically by history taking losartan 50  mg daily.    Orders:  -     Comprehensive Metabolic Panel; Future  -     Comprehensive Metabolic Panel    3. Dyslipidemia  Assessment & Plan:  Taking atorvastatin 80 mg nightly with addition of Nexlizet several months ago.  Update lipid profile.    Orders:  -     Lipid Panel; Future  -     Lipid Panel    4. Subclinical hypothyroidism  Assessment & Plan:  Repeat thyroid function testing.  Not on any thyroid replacement at this time.    Orders:  -     TSH; Future  -     T4, Free; Future  -     T4, Free  -     TSH    5. Moderate obesity  Assessment & Plan:  Status post Kristine-en-Y gastric bypass procedure in August 2012, subsequent 100 pound weight loss maintenance, currently also getting potential benefit with Ozempic prescription, having been taking 1 mg subcu weekly, plan to increase to 2 mg subcu weekly primarily prescribed for his diabetes, dissipating secondary weight loss, pursue healthy lifestyle with diet and exercise.          Follow Up:   Return in about 5 months (around 9/19/2024) for Annual physical.      At [facility], we believe that sharing information builds trust and better relationships. You are receiving this note because you recently visited [facility]. It is possible you will see health information before a provider has talked with you about it. This kind of information can be easy to misunderstand. To help you fully understand what it means for your health, we urge you to discuss this note with your provider.    Yahir Moore MD  [facility] [place] [facility]

## 2024-04-19 NOTE — ASSESSMENT & PLAN NOTE
Satisfactory blood pressure control acutely as well as chronically by history taking losartan 50 mg daily.

## 2024-04-19 NOTE — ASSESSMENT & PLAN NOTE
Taking atorvastatin 80 mg nightly with addition of Nexlizet several months ago.  Update lipid profile.

## 2024-04-19 NOTE — ASSESSMENT & PLAN NOTE
Most recent hemoglobin A1c 7.1% in 11/2023 improved to 6.3% today, taking Ozempic currently 1 mg subcu weekly and metformin  mg at 2 tablets daily.  Increase Ozempic up to 2 mg subcu weekly, with patient to monitor blood sugars regularly, and if consistently dropping below 80 then he is to reduce his metformin  mg from 2 down to 1 tablet daily.  Hemoglobin A1c next visit.

## 2024-04-19 NOTE — ASSESSMENT & PLAN NOTE
Status post Kristine-en-Y gastric bypass procedure in August 2012, subsequent 100 pound weight loss maintenance, currently also getting potential benefit with Ozempic prescription, having been taking 1 mg subcu weekly, plan to increase to 2 mg subcu weekly primarily prescribed for his diabetes, dissipating secondary weight loss, pursue healthy lifestyle with diet and exercise.

## 2024-04-20 LAB
ALBUMIN SERPL-MCNC: 4 G/DL (ref 3.8–4.9)
ALBUMIN/GLOB SERPL: 1.6 {RATIO} (ref 1.2–2.2)
ALP SERPL-CCNC: 85 IU/L (ref 44–121)
ALT SERPL-CCNC: 74 IU/L (ref 0–44)
AST SERPL-CCNC: 93 IU/L (ref 0–40)
BILIRUB SERPL-MCNC: 0.5 MG/DL (ref 0–1.2)
BUN SERPL-MCNC: 9 MG/DL (ref 6–24)
BUN/CREAT SERPL: 9 (ref 9–20)
CALCIUM SERPL-MCNC: 8.8 MG/DL (ref 8.7–10.2)
CHLORIDE SERPL-SCNC: 105 MMOL/L (ref 96–106)
CHOLEST SERPL-MCNC: 177 MG/DL (ref 100–199)
CO2 SERPL-SCNC: 21 MMOL/L (ref 20–29)
CREAT SERPL-MCNC: 0.95 MG/DL (ref 0.76–1.27)
EGFRCR SERPLBLD CKD-EPI 2021: 95 ML/MIN/1.73
GLOBULIN SER CALC-MCNC: 2.5 G/DL (ref 1.5–4.5)
GLUCOSE SERPL-MCNC: 119 MG/DL (ref 70–99)
HDLC SERPL-MCNC: 44 MG/DL
LDLC SERPL CALC-MCNC: 120 MG/DL (ref 0–99)
POTASSIUM SERPL-SCNC: 4.6 MMOL/L (ref 3.5–5.2)
PROT SERPL-MCNC: 6.5 G/DL (ref 6–8.5)
SODIUM SERPL-SCNC: 138 MMOL/L (ref 134–144)
T4 FREE SERPL-MCNC: 1.12 NG/DL (ref 0.82–1.77)
TRIGL SERPL-MCNC: 66 MG/DL (ref 0–149)
TSH SERPL DL<=0.005 MIU/L-ACNC: 4 UIU/ML (ref 0.45–4.5)
VLDLC SERPL CALC-MCNC: 13 MG/DL (ref 5–40)

## 2024-04-24 ENCOUNTER — TELEPHONE (OUTPATIENT)
Dept: FAMILY MEDICINE CLINIC | Facility: CLINIC | Age: 54
End: 2024-04-24
Payer: COMMERCIAL

## 2024-04-24 DIAGNOSIS — E78.5 DYSLIPIDEMIA: Primary | ICD-10-CM

## 2024-04-24 RX ORDER — EZETIMIBE 10 MG/1
10 TABLET ORAL DAILY
Qty: 90 TABLET | Refills: 3 | Status: SHIPPED | OUTPATIENT
Start: 2024-04-24

## 2024-04-24 NOTE — TELEPHONE ENCOUNTER
Reviewed testing from 4/19/2024 as follows:    TSH and free T4 normal  CMP normal other than slight elevation of his AST at 93 and ALT of 74, previous values normal  Total cholesterol 177, triglycerides 66, HDL 44, , previous value 5 months early with total cholesterol 159, triglycerides 72, HDL 35,     Assessment/plan:  1.  Subclinical hypothyroidism.  Normalization of TFTs.  Follow-up periodically.  2.  Mild elevated liver transaminases.  Likely either related to medication or steatohepatitis.  Simply follow-up periodically.  3.  Dyslipidemia.  Patient originally prescribed atorvastatin 80 mg nightly, several months ago having had the addition of prescription of Nexlizet 1 tablet daily.apparently the Nexlizet however was cost prohibitive thus he has not been taking the medication, simply his atorvastatin alone.  Plan will be to add plain Zetia 10 mg daily to his atorvastatin and recheck another lipid profile in 3 months.

## 2024-05-09 ENCOUNTER — OFFICE VISIT (OUTPATIENT)
Age: 54
End: 2024-05-09
Payer: COMMERCIAL

## 2024-05-09 ENCOUNTER — LAB (OUTPATIENT)
Facility: HOSPITAL | Age: 54
End: 2024-05-09
Payer: COMMERCIAL

## 2024-05-09 VITALS
WEIGHT: 240 LBS | DIASTOLIC BLOOD PRESSURE: 80 MMHG | HEIGHT: 68 IN | SYSTOLIC BLOOD PRESSURE: 140 MMHG | BODY MASS INDEX: 36.37 KG/M2 | TEMPERATURE: 97.7 F | HEART RATE: 84 BPM

## 2024-05-09 DIAGNOSIS — R76.12 POSITIVE QUANTIFERON-TB GOLD TEST: ICD-10-CM

## 2024-05-09 DIAGNOSIS — Z79.899 IMMUNODEFICIENCY DUE TO DRUG THERAPY: ICD-10-CM

## 2024-05-09 DIAGNOSIS — Z79.899 ENCOUNTER FOR MONITORING LEFLUNOMIDE THERAPY: ICD-10-CM

## 2024-05-09 DIAGNOSIS — Z51.81 ENCOUNTER FOR MONITORING LEFLUNOMIDE THERAPY: ICD-10-CM

## 2024-05-09 DIAGNOSIS — D84.821 IMMUNODEFICIENCY DUE TO DRUG THERAPY: ICD-10-CM

## 2024-05-09 DIAGNOSIS — Z79.1 NSAID LONG-TERM USE: ICD-10-CM

## 2024-05-09 DIAGNOSIS — L40.50 PSORIATIC ARTHRITIS: Primary | ICD-10-CM

## 2024-05-09 DIAGNOSIS — R53.83 FATIGUE, UNSPECIFIED TYPE: ICD-10-CM

## 2024-05-09 DIAGNOSIS — L40.50 PSORIATIC ARTHRITIS: ICD-10-CM

## 2024-05-09 PROBLEM — Z79.69 ENCOUNTER FOR MONITORING LEFLUNOMIDE THERAPY: Status: ACTIVE | Noted: 2023-09-07

## 2024-05-09 LAB
ALBUMIN SERPL-MCNC: 4 G/DL (ref 3.5–5.2)
ALBUMIN/GLOB SERPL: 1.5 G/DL
ALP SERPL-CCNC: 58 U/L (ref 39–117)
ALT SERPL W P-5'-P-CCNC: 17 U/L (ref 1–41)
ANION GAP SERPL CALCULATED.3IONS-SCNC: 8.4 MMOL/L (ref 5–15)
AST SERPL-CCNC: 18 U/L (ref 1–40)
BASOPHILS # BLD AUTO: 0.08 10*3/MM3 (ref 0–0.2)
BASOPHILS NFR BLD AUTO: 1.2 % (ref 0–1.5)
BILIRUB SERPL-MCNC: 0.5 MG/DL (ref 0–1.2)
BUN SERPL-MCNC: 11 MG/DL (ref 6–20)
BUN/CREAT SERPL: 9.8 (ref 7–25)
CALCIUM SPEC-SCNC: 9 MG/DL (ref 8.6–10.5)
CHLORIDE SERPL-SCNC: 103 MMOL/L (ref 98–107)
CO2 SERPL-SCNC: 25.6 MMOL/L (ref 22–29)
CREAT SERPL-MCNC: 1.12 MG/DL (ref 0.76–1.27)
CRP SERPL-MCNC: <0.3 MG/DL (ref 0–0.5)
DEPRECATED RDW RBC AUTO: 47.7 FL (ref 37–54)
EGFRCR SERPLBLD CKD-EPI 2021: 77.6 ML/MIN/1.73
EOSINOPHIL # BLD AUTO: 0.17 10*3/MM3 (ref 0–0.4)
EOSINOPHIL NFR BLD AUTO: 2.6 % (ref 0.3–6.2)
ERYTHROCYTE [DISTWIDTH] IN BLOOD BY AUTOMATED COUNT: 14.4 % (ref 12.3–15.4)
GLOBULIN UR ELPH-MCNC: 2.7 GM/DL
GLUCOSE SERPL-MCNC: 179 MG/DL (ref 65–99)
HAV IGM SERPL QL IA: NORMAL
HBV CORE IGM SERPL QL IA: NORMAL
HBV SURFACE AG SERPL QL IA: NORMAL
HCT VFR BLD AUTO: 41.6 % (ref 37.5–51)
HCV AB SER QL: NORMAL
HGB BLD-MCNC: 13.9 G/DL (ref 13–17.7)
IMM GRANULOCYTES # BLD AUTO: 0.01 10*3/MM3 (ref 0–0.05)
IMM GRANULOCYTES NFR BLD AUTO: 0.2 % (ref 0–0.5)
LYMPHOCYTES # BLD AUTO: 2.16 10*3/MM3 (ref 0.7–3.1)
LYMPHOCYTES NFR BLD AUTO: 33.5 % (ref 19.6–45.3)
MCH RBC QN AUTO: 30.2 PG (ref 26.6–33)
MCHC RBC AUTO-ENTMCNC: 33.4 G/DL (ref 31.5–35.7)
MCV RBC AUTO: 90.4 FL (ref 79–97)
MONOCYTES # BLD AUTO: 0.35 10*3/MM3 (ref 0.1–0.9)
MONOCYTES NFR BLD AUTO: 5.4 % (ref 5–12)
NEUTROPHILS NFR BLD AUTO: 3.67 10*3/MM3 (ref 1.7–7)
NEUTROPHILS NFR BLD AUTO: 57.1 % (ref 42.7–76)
NRBC BLD AUTO-RTO: 0 /100 WBC (ref 0–0.2)
PLATELET # BLD AUTO: 280 10*3/MM3 (ref 140–450)
PMV BLD AUTO: 11.9 FL (ref 6–12)
POTASSIUM SERPL-SCNC: 5.1 MMOL/L (ref 3.5–5.2)
PROT SERPL-MCNC: 6.7 G/DL (ref 6–8.5)
RBC # BLD AUTO: 4.6 10*6/MM3 (ref 4.14–5.8)
SODIUM SERPL-SCNC: 137 MMOL/L (ref 136–145)
WBC NRBC COR # BLD AUTO: 6.44 10*3/MM3 (ref 3.4–10.8)

## 2024-05-09 PROCEDURE — 86140 C-REACTIVE PROTEIN: CPT

## 2024-05-09 PROCEDURE — 86480 TB TEST CELL IMMUN MEASURE: CPT

## 2024-05-09 PROCEDURE — 36415 COLL VENOUS BLD VENIPUNCTURE: CPT

## 2024-05-09 PROCEDURE — 85652 RBC SED RATE AUTOMATED: CPT

## 2024-05-09 PROCEDURE — 80074 ACUTE HEPATITIS PANEL: CPT

## 2024-05-09 PROCEDURE — 85025 COMPLETE CBC W/AUTO DIFF WBC: CPT

## 2024-05-09 PROCEDURE — 80053 COMPREHEN METABOLIC PANEL: CPT

## 2024-05-09 RX ORDER — MELOXICAM 15 MG/1
15 TABLET ORAL DAILY
Qty: 30 TABLET | Refills: 4 | Status: SHIPPED | OUTPATIENT
Start: 2024-05-09

## 2024-05-09 RX ORDER — ADALIMUMAB-ADAZ 40 MG/.8ML
40 INJECTION, SOLUTION SUBCUTANEOUS
Qty: 4 ML | Refills: 4 | Status: SHIPPED | OUTPATIENT
Start: 2024-05-09

## 2024-05-09 RX ORDER — ACYCLOVIR 50 MG/G
1 OINTMENT TOPICAL 3 TIMES DAILY
COMMUNITY
Start: 2024-04-25

## 2024-05-09 RX ORDER — LEFLUNOMIDE 20 MG/1
20 TABLET ORAL DAILY
Qty: 30 TABLET | Refills: 4 | Status: SHIPPED | OUTPATIENT
Start: 2024-05-09

## 2024-05-10 LAB — ERYTHROCYTE [SEDIMENTATION RATE] IN BLOOD: 9 MM/HR (ref 0–20)

## 2024-05-11 LAB
GAMMA INTERFERON BACKGROUND BLD IA-ACNC: 0.04 IU/ML
M TB IFN-G BLD-IMP: NEGATIVE
M TB IFN-G CD4+ BCKGRND COR BLD-ACNC: 0.18 IU/ML
M TB IFN-G CD4+CD8+ BCKGRND COR BLD-ACNC: 0.15 IU/ML
MITOGEN IGNF BCKGRD COR BLD-ACNC: >10 IU/ML
SERVICE CMNT-IMP: NORMAL

## 2024-07-30 DIAGNOSIS — E11.29 TYPE 2 DIABETES MELLITUS WITH OTHER DIABETIC KIDNEY COMPLICATION: ICD-10-CM

## 2024-07-30 RX ORDER — SEMAGLUTIDE 2.68 MG/ML
INJECTION, SOLUTION SUBCUTANEOUS
Qty: 3 ML | Refills: 2 | Status: SHIPPED | OUTPATIENT
Start: 2024-07-30

## 2024-08-26 DIAGNOSIS — K21.00 GASTROESOPHAGEAL REFLUX DISEASE WITH ESOPHAGITIS, UNSPECIFIED WHETHER HEMORRHAGE: ICD-10-CM

## 2024-08-26 RX ORDER — OMEPRAZOLE 40 MG/1
40 CAPSULE, DELAYED RELEASE ORAL DAILY
Qty: 90 CAPSULE | Refills: 1 | Status: SHIPPED | OUTPATIENT
Start: 2024-08-26 | End: 2024-08-26

## 2024-08-26 RX ORDER — OMEPRAZOLE 40 MG/1
40 CAPSULE, DELAYED RELEASE ORAL DAILY
Qty: 90 CAPSULE | Refills: 0 | Status: SHIPPED | OUTPATIENT
Start: 2024-08-26

## 2024-08-27 DIAGNOSIS — E78.5 DYSLIPIDEMIA: ICD-10-CM

## 2024-08-27 RX ORDER — EZETIMIBE 10 MG/1
10 TABLET ORAL DAILY
Qty: 90 TABLET | Refills: 2 | Status: SHIPPED | OUTPATIENT
Start: 2024-08-27

## 2024-09-09 ENCOUNTER — TELEPHONE (OUTPATIENT)
Dept: FAMILY MEDICINE CLINIC | Facility: CLINIC | Age: 54
End: 2024-09-09
Payer: COMMERCIAL

## 2024-09-09 NOTE — TELEPHONE ENCOUNTER
Patient's daughter who works in our office as an MA contacted me indicating patient's smart watch indicated he had had several runs of A-fib over the last several days.  These were apparently asymptomatic.  Question regarding follow-up.  If there is a agonism on the smart watch to retroactively look at the tracings, then he is advised to come here for evaluation.  If this is not the case, then I advised, given he has an established patient with Dr. Chari Osborn of cardiology, simply to contact her office for follow-up.  He previously had a low risk stress echocardiogram in 11/2023.  My anticipation would be under that scenario that he would likely undergo a Holter monitor.

## 2024-10-17 ENCOUNTER — TELEPHONE (OUTPATIENT)
Age: 54
End: 2024-10-17
Payer: COMMERCIAL

## 2024-11-15 DIAGNOSIS — E11.29 TYPE 2 DIABETES MELLITUS WITH OTHER DIABETIC KIDNEY COMPLICATION: ICD-10-CM

## 2024-11-15 RX ORDER — SEMAGLUTIDE 2.68 MG/ML
INJECTION, SOLUTION SUBCUTANEOUS
Qty: 3 ML | Refills: 3 | Status: SHIPPED | OUTPATIENT
Start: 2024-11-15

## 2025-02-05 RX ORDER — ADALIMUMAB-ADAZ 40 MG/.4ML
40 INJECTION, SOLUTION SUBCUTANEOUS
Qty: 0.8 ML | Refills: 2 | Status: SHIPPED | OUTPATIENT
Start: 2025-02-05

## 2025-02-05 NOTE — TELEPHONE ENCOUNTER
Specialty Pharmacy Patient Management Program  Per Protocol Prescription Order/Refill     Patient currently fills medications at [facility] and is not enrolled in an Rheumatology Patient Management Program.     Requested Prescriptions     Signed Prescriptions Disp Refills    Adalimumab-adaz (Hyrimoz) 40 MG/0.4ML solution auto-injector 0.8 mL 2     Sig: Inject 40 mg under the skin into the appropriate area as directed Every 14 (Fourteen) Days. Need an appointment for further refills     Authorizing Provider: DELPHINE SALINAS     Ordering User: RIGOBERTO ALEJO     Prescription orders above were sent to the pharmacy per Collaborative Care Agreement Protocol.     Rigoberto Alejo, PharmD, BCPS  Clinical Specialty Pharmacist, Rheumatology   2/5/2025  12:30 EST

## 2025-02-10 ENCOUNTER — TELEPHONE (OUTPATIENT)
Age: 55
End: 2025-02-10
Payer: COMMERCIAL

## 2025-02-10 NOTE — TELEPHONE ENCOUNTER
"Caller: Derek Hernandez \"Mckinley\"    Relationship: Self    Best call back number: [phone]     Requested Prescriptions:   - meloxicam (MOBIC) 15 MG tablet [[id number]] (Order [id number])     - leflunomide (ARAVA) 20 MG tablet [[id number]] (Order [id number])     Pharmacy where request should be sent: [place] [id number] [facility] [phone] [facility] [phone]      Last office visit with prescribing clinician: 5/9/2024      Additional details provided by patient: PATIENT WAS UNAWARE HE WAS SUPPOSED TO BE TAKING THESE MEDICATIONS. HE STATED THEY WERE LISTED ON HIS MED LIST AT AN APPOINTMENT HE HAD GOING OVER A SURGERY. HIS PHARMACY NEVER FILLED THEM SO HE WOULD LIKE THEM RESENT SO HE CAN BEGIN TAKING THEM NOW.     Does the patient have less than a 3 day supply:  [x] Yes  [] No    Would you like a call back once the refill request has been completed: [] Yes [x] No    If the office needs to give you a call back, can they leave a voicemail: [] Yes [x] No      "

## 2025-02-13 NOTE — TELEPHONE ENCOUNTER
Spoke to pt. He had a biopsy of the skin today and has dissolvable stiches.  Is it okay for him to take his Hyrimoz, Arava, Meloxicam.  Does he need to hold any of these? He's not been taking the Arava and Meloxicam but is going to start back now that he knows ( he was confused and thought he was only suppose to be on the injection. )

## 2025-02-14 RX ORDER — MELOXICAM 15 MG/1
15 TABLET ORAL DAILY
Qty: 30 TABLET | Refills: 3 | OUTPATIENT
Start: 2025-02-14

## 2025-03-04 RX ORDER — LEFLUNOMIDE 20 MG/1
20 TABLET ORAL DAILY
Qty: 30 TABLET | Refills: 3 | OUTPATIENT
Start: 2025-03-04

## 2025-03-04 NOTE — TELEPHONE ENCOUNTER
Patient needs appointment and updated labs for leflunomide refill. Last seen 5/9/24, canceled 9/11/24 and has not rescheduled. Declining leflonomide refill request at this time.      HUB OK TO RELAY

## 2025-03-07 DIAGNOSIS — K21.00 GASTROESOPHAGEAL REFLUX DISEASE WITH ESOPHAGITIS, UNSPECIFIED WHETHER HEMORRHAGE: ICD-10-CM

## 2025-03-07 RX ORDER — OMEPRAZOLE 40 MG/1
40 CAPSULE, DELAYED RELEASE ORAL DAILY
Qty: 90 CAPSULE | Refills: 1 | Status: SHIPPED | OUTPATIENT
Start: 2025-03-07

## 2025-03-24 ENCOUNTER — TELEPHONE (OUTPATIENT)
Age: 55
End: 2025-03-24
Payer: COMMERCIAL

## 2025-03-24 DIAGNOSIS — L40.50 PSORIATIC ARTHRITIS: ICD-10-CM

## 2025-03-24 DIAGNOSIS — Z79.899 ENCOUNTER FOR MONITORING LEFLUNOMIDE THERAPY: ICD-10-CM

## 2025-03-24 DIAGNOSIS — R76.12 POSITIVE QUANTIFERON-TB GOLD TEST: ICD-10-CM

## 2025-03-24 DIAGNOSIS — Z51.81 ENCOUNTER FOR MONITORING LEFLUNOMIDE THERAPY: ICD-10-CM

## 2025-03-24 DIAGNOSIS — Z79.1 NSAID LONG-TERM USE: ICD-10-CM

## 2025-03-24 DIAGNOSIS — Z79.899 IMMUNODEFICIENCY DUE TO DRUG THERAPY: Primary | ICD-10-CM

## 2025-03-24 DIAGNOSIS — D84.821 IMMUNODEFICIENCY DUE TO DRUG THERAPY: Primary | ICD-10-CM

## 2025-03-24 NOTE — TELEPHONE ENCOUNTER
"  Caller: Derek Hernandez \"Mckinley\"    Relationship: Self    Best call back number: [phone]     What orders are you requesting (i.e. lab or imaging): LABS    In what timeframe would the patient need to come in: ASAP    Where will you receive your lab/imaging services: [facility]    Additional notes: PLEASE PLACE LAB ORDER DO PT CAN HAVE REFILL OF MEDS. HIS HANDS ARE HURTING SO BADLY THAT HE CANNOT HOLD A GOLF CLUB.  "

## 2025-03-31 ENCOUNTER — OFFICE VISIT (OUTPATIENT)
Age: 55
End: 2025-03-31
Payer: COMMERCIAL

## 2025-03-31 ENCOUNTER — LAB (OUTPATIENT)
Facility: HOSPITAL | Age: 55
End: 2025-03-31
Payer: COMMERCIAL

## 2025-03-31 VITALS
WEIGHT: 240 LBS | BODY MASS INDEX: 36.37 KG/M2 | DIASTOLIC BLOOD PRESSURE: 90 MMHG | SYSTOLIC BLOOD PRESSURE: 140 MMHG | HEART RATE: 78 BPM | HEIGHT: 68 IN

## 2025-03-31 DIAGNOSIS — R76.12 POSITIVE QUANTIFERON-TB GOLD TEST: Chronic | ICD-10-CM

## 2025-03-31 DIAGNOSIS — M15.0 PRIMARY OSTEOARTHRITIS INVOLVING MULTIPLE JOINTS: Chronic | ICD-10-CM

## 2025-03-31 DIAGNOSIS — Z79.69 ENCOUNTER FOR MONITORING LEFLUNOMIDE THERAPY: Chronic | ICD-10-CM

## 2025-03-31 DIAGNOSIS — E11.29 TYPE 2 DIABETES MELLITUS WITH OTHER DIABETIC KIDNEY COMPLICATION: ICD-10-CM

## 2025-03-31 DIAGNOSIS — L40.50 PSORIATIC ARTHRITIS: Primary | Chronic | ICD-10-CM

## 2025-03-31 DIAGNOSIS — Z79.899 IMMUNODEFICIENCY DUE TO DRUG THERAPY: Chronic | ICD-10-CM

## 2025-03-31 DIAGNOSIS — D84.821 IMMUNODEFICIENCY DUE TO DRUG THERAPY: Chronic | ICD-10-CM

## 2025-03-31 DIAGNOSIS — Z79.1 NSAID LONG-TERM USE: Chronic | ICD-10-CM

## 2025-03-31 DIAGNOSIS — Z51.81 ENCOUNTER FOR MONITORING LEFLUNOMIDE THERAPY: Chronic | ICD-10-CM

## 2025-03-31 DIAGNOSIS — L40.50 PSORIATIC ARTHRITIS: Chronic | ICD-10-CM

## 2025-03-31 PROBLEM — R53.83 FATIGUE: Status: RESOLVED | Noted: 2024-05-09 | Resolved: 2025-03-31

## 2025-03-31 PROBLEM — L40.53 PSORIATIC SPONDYLITIS: Status: RESOLVED | Noted: 2022-10-05 | Resolved: 2025-03-31

## 2025-03-31 LAB
ALBUMIN SERPL-MCNC: 4.1 G/DL (ref 3.5–5.2)
ALBUMIN/GLOB SERPL: 1.4 G/DL
ALP SERPL-CCNC: 64 U/L (ref 39–117)
ALT SERPL W P-5'-P-CCNC: 18 U/L (ref 1–41)
ANION GAP SERPL CALCULATED.3IONS-SCNC: 10.1 MMOL/L (ref 5–15)
AST SERPL-CCNC: 23 U/L (ref 1–40)
BASOPHILS # BLD AUTO: 0.08 10*3/MM3 (ref 0–0.2)
BASOPHILS NFR BLD AUTO: 1.3 % (ref 0–1.5)
BILIRUB SERPL-MCNC: 0.7 MG/DL (ref 0–1.2)
BUN SERPL-MCNC: 11 MG/DL (ref 6–20)
BUN/CREAT SERPL: 10.4 (ref 7–25)
CALCIUM SPEC-SCNC: 8.8 MG/DL (ref 8.6–10.5)
CHLORIDE SERPL-SCNC: 102 MMOL/L (ref 98–107)
CO2 SERPL-SCNC: 25.9 MMOL/L (ref 22–29)
CREAT SERPL-MCNC: 1.06 MG/DL (ref 0.76–1.27)
CRP SERPL-MCNC: <0.3 MG/DL (ref 0–0.5)
DEPRECATED RDW RBC AUTO: 42.5 FL (ref 37–54)
EGFRCR SERPLBLD CKD-EPI 2021: 82.4 ML/MIN/1.73
EOSINOPHIL # BLD AUTO: 0.24 10*3/MM3 (ref 0–0.4)
EOSINOPHIL NFR BLD AUTO: 4 % (ref 0.3–6.2)
ERYTHROCYTE [DISTWIDTH] IN BLOOD BY AUTOMATED COUNT: 13 % (ref 12.3–15.4)
ERYTHROCYTE [SEDIMENTATION RATE] IN BLOOD: 12 MM/HR (ref 0–20)
GLOBULIN UR ELPH-MCNC: 2.9 GM/DL
GLUCOSE SERPL-MCNC: 108 MG/DL (ref 65–99)
HCT VFR BLD AUTO: 43.8 % (ref 37.5–51)
HGB BLD-MCNC: 14.1 G/DL (ref 13–17.7)
IMM GRANULOCYTES # BLD AUTO: 0.02 10*3/MM3 (ref 0–0.05)
IMM GRANULOCYTES NFR BLD AUTO: 0.3 % (ref 0–0.5)
LYMPHOCYTES # BLD AUTO: 2.9 10*3/MM3 (ref 0.7–3.1)
LYMPHOCYTES NFR BLD AUTO: 48.3 % (ref 19.6–45.3)
MCH RBC QN AUTO: 28.4 PG (ref 26.6–33)
MCHC RBC AUTO-ENTMCNC: 32.2 G/DL (ref 31.5–35.7)
MCV RBC AUTO: 88.3 FL (ref 79–97)
MONOCYTES # BLD AUTO: 0.42 10*3/MM3 (ref 0.1–0.9)
MONOCYTES NFR BLD AUTO: 7 % (ref 5–12)
NEUTROPHILS NFR BLD AUTO: 2.34 10*3/MM3 (ref 1.7–7)
NEUTROPHILS NFR BLD AUTO: 39.1 % (ref 42.7–76)
NRBC BLD AUTO-RTO: 0 /100 WBC (ref 0–0.2)
PLATELET # BLD AUTO: 269 10*3/MM3 (ref 140–450)
PMV BLD AUTO: 11.7 FL (ref 6–12)
POTASSIUM SERPL-SCNC: 4.7 MMOL/L (ref 3.5–5.2)
PROT SERPL-MCNC: 7 G/DL (ref 6–8.5)
RBC # BLD AUTO: 4.96 10*6/MM3 (ref 4.14–5.8)
SODIUM SERPL-SCNC: 138 MMOL/L (ref 136–145)
WBC NRBC COR # BLD AUTO: 6 10*3/MM3 (ref 3.4–10.8)

## 2025-03-31 PROCEDURE — 85652 RBC SED RATE AUTOMATED: CPT

## 2025-03-31 PROCEDURE — 86140 C-REACTIVE PROTEIN: CPT

## 2025-03-31 PROCEDURE — 80053 COMPREHEN METABOLIC PANEL: CPT

## 2025-03-31 PROCEDURE — 36415 COLL VENOUS BLD VENIPUNCTURE: CPT

## 2025-03-31 PROCEDURE — 85025 COMPLETE CBC W/AUTO DIFF WBC: CPT

## 2025-03-31 RX ORDER — ADALIMUMAB-ADAZ 40 MG/.4ML
40 INJECTION, SOLUTION SUBCUTANEOUS
Qty: 0.8 ML | Refills: 2 | Status: SHIPPED | OUTPATIENT
Start: 2025-03-31

## 2025-03-31 RX ORDER — LEFLUNOMIDE 20 MG/1
20 TABLET ORAL DAILY
Qty: 30 TABLET | Refills: 4 | Status: SHIPPED | OUTPATIENT
Start: 2025-03-31

## 2025-03-31 RX ORDER — MELOXICAM 15 MG/1
15 TABLET ORAL DAILY
Qty: 30 TABLET | Refills: 4 | Status: SHIPPED | OUTPATIENT
Start: 2025-03-31

## 2025-03-31 NOTE — ASSESSMENT & PLAN NOTE
Adalimumab 40 mg SQ once/week for psoriatic arthritis     1. Hold if the patient develops infection.   2. Avoid live vaccines while on this medication.   3. No recent serious infections  4. No injection site reactions.   5. Also hold this medication perioperatively if the patient is going to have a surgical procedure

## 2025-03-31 NOTE — ASSESSMENT & PLAN NOTE
* 11/15/2018 his QuantiFERON TB test was positive  We referred him to an infectious disease specialist. They have treated him for latent TB.  He has been cleared to resume/take immunosuppressive medications. See note from [facility] 2/2019

## 2025-03-31 NOTE — ASSESSMENT & PLAN NOTE
Tylenol PRN is ok as directed  He has tried oral NSAIDS like meloxicam PRN   He has taken muscle relaxer's like cyclobenzaprine PRN

## 2025-03-31 NOTE — PROGRESS NOTES
Office Follow Up      Date: 03/31/2025   Patient Name: Derek Hernandez  MRN: [id number]  YOB: 1970    Referring Physician: No ref. provider found     Chief Complaint   Patient presents with    Osteoarthritis     Follow up    Psoriatic arthritis      Follow up       History of Present Illness: Derek Hernandez is a 54 y.o. male who is here today for follow up. He established care with us on 8/14/2018. We prescribed him Arava. 11/15/2018 his QuantiFERON TB test was positive. He was treated by  [facility] and cleared to start advanced treatment for RA  2/2019.     We also prescribe him adalimumab.  He has tolerated this well. No injections site reactions. He takes meloxicam PRN.    Today he reports he is feeling worse.     He rates his pain as 7.5/10 in severity. He has 1.5 hours/day of morning stiffness. No red or hot joints. No joint swelling. No muscle pain or weakness. No back or neck problems.     No new rash/skin lesions. No hair loss. No headaches or paresthesias. No lymphadenopathy. No abnormal bruising/bleeding. No GI or  issues. No chest pain. No shortness of breath. No sicca symptoms.       Subjective     Review of Systems   HENT: Negative.     Eyes: Negative.    Respiratory: Negative.     Cardiovascular: Negative.    Gastrointestinal: Negative.    Endocrine: Negative.    Genitourinary: Negative.    Musculoskeletal:  Positive for arthralgias.   Skin: Negative.    Allergic/Immunologic: Negative.    Neurological: Negative.    Hematological: Negative.    Psychiatric/Behavioral: Negative.     All other systems reviewed and are negative.         Current Outpatient Medications:     acyclovir (ZOVIRAX) 5 % ointment, Apply 1 Application topically to the appropriate area as directed 3 times a day., Disp: , Rfl:     Adalimumab-adaz (Hyrimoz) 40 MG/0.4ML solution auto-injector, Inject 40 mg under the skin into the appropriate area as directed Every 14 (Fourteen) Days.  "Need an appointment for further refills, Disp: 0.8 mL, Rfl: 2    atorvastatin (LIPITOR) 80 MG tablet, TAKE 1 TABLET BY MOUTH AT BEDTIME, Disp: 90 tablet, Rfl: 2    Cholecalciferol 25 MCG (1000 UT) tablet, Take 1 tablet by mouth Daily., Disp: , Rfl:     ezetimibe (ZETIA) 10 MG tablet, TAKE 1 TABLET BY MOUTH EVERY DAY, Disp: 90 tablet, Rfl: 2    leflunomide (ARAVA) 20 MG tablet, Take 1 tablet by mouth Daily., Disp: 30 tablet, Rfl: 4    losartan (COZAAR) 50 MG tablet, TAKE 1 TABLET BY MOUTH ONCE DAILY, Disp: 90 tablet, Rfl: 2    meloxicam (MOBIC) 15 MG tablet, Take 1 tablet by mouth Daily., Disp: 30 tablet, Rfl: 4    metFORMIN ER (GLUCOPHAGE-XR) 500 MG 24 hr tablet, 2 tablets with evening meal, Disp: 180 tablet, Rfl: 3    omeprazole (priLOSEC) 40 MG capsule, TAKE 1 CAPSULE BY MOUTH EVERY DAY, Disp: 90 capsule, Rfl: 1    Semaglutide, 2 MG/DOSE, (Ozempic, 2 MG/DOSE,) 8 MG/3ML solution pen-injector, INJECT 2 MG UNDER THE SKIN INTO THE APPROPRIATE AREA AS DIRECTED ONCE WEEKLY, Disp: 3 mL, Rfl: 3    No Known Allergies    I have reviewed and updated the patient's chief complaint, history of present illness, review of systems, past medical history, surgical history, family history, social history, medications and allergy list as appropriate.     Objective      Vitals:    03/31/25 0838   BP: 140/90   BP Location: Left arm   Pulse: 78   Weight: 109 kg (240 lb)   Height: 172.7 cm (68\")   PainSc: 7    PainLoc: Hand     Body mass index is 36.49 kg/m².      Physical Exam     General: Well appearing 54 year old  male. Not in distress. He is ambulating unassisted.   SKIN: I see no psoriatic plaques today. No alopecia. No subcutaneous nodules. No digital pits or ulcers. No sclerodactyly.   HEENT: NCAT. Conjunctiva clear, no photophobia. No oral or nasal ulcers.   Pulmonary: Clear to auscultation bilaterally. No wheezing, rales, or rhonchi.  CV: Regular rate and rhythm. No murmurs, rubs, or gallops.   Psych: Normal mood and " affect. Alert and oriented x 3.   Extremities: No cyanosis or edema.   Musculoskeletal: His PIP joints are swollen and tender bilaterally. No tenderness to palpation.  No warmth or erythema. Normal range of motion of the wrists, ankles, elbows, and knees.   Lymph: No palpable cervical adenopathy            Procedures    Assessment / Plan      Assessment & Plan  Psoriatic arthritis  * Medications/treatments/interventions tried include: Meloxicam, prednisone taper, Flexeril PRN, Arava, adalimumab     1. Check labs   2. He has been without adalimumab for a couple of months. My support staff informs me today that his insurance now prefers a Biolsimilar version of adalimumab rather than name brand Humira. We will make this change as instructed. Risks and benefits reviewed   3. Continue refill meloxicam and Arava   4. We gave him a handout on psoriasis to take home and review   5. Follow up in 4 months  Immunodeficiency due to drug therapy  Adalimumab 40 mg SQ once/week for psoriatic arthritis     1. Hold if the patient develops infection.   2. Avoid live vaccines while on this medication.   3. No recent serious infections  4. No injection site reactions.   5. Also hold this medication perioperatively if the patient is going to have a surgical procedure  Encounter for monitoring leflunomide therapy  * Arava 20 mg/day for psoriatic arthritis     1. CBC and CMP every 8-12 weeks to monitor for toxicity.   2. Check labs.   3. No recent serious infections     Positive QuantiFERON-TB Gold test   * 11/15/2018 his QuantiFERON TB test was positive  We referred him to an infectious disease specialist. They have treated him for latent TB.  He has been cleared to resume/take immunosuppressive medications. See note from [facility] 2/2019  Primary osteoarthritis involving multiple joints  Tylenol PRN is ok as directed  He has tried oral NSAIDS like meloxicam PRN   He has taken muscle relaxer's like cyclobenzaprine PRN    NSAID long-term use  Meloxicam PRN 15 mg PO once/day for pain relief   Do not take over-the counter anti-inflammatory medicines, such as ibuprofen or naproxen (Advil, Motrin, Aleve and others), as they may cause problems when combined with your prescription medications.  In general, low dose daily aspirin for the treatment or prevention of heart disease or stroke is safe to take.  Acetaminophen (Tylenol) is generally safe to take as directed for headaches, cramps or other aches and pains or fever reduction    Orders Placed This Encounter   Procedures    CBC Auto Differential    Comprehensive Metabolic Panel    C-reactive Protein    Sedimentation Rate       New Medications Ordered This Visit   Medications    meloxicam (MOBIC) 15 MG tablet     Sig: Take 1 tablet by mouth Daily.     Dispense:  30 tablet     Refill:  4    leflunomide (ARAVA) 20 MG tablet     Sig: Take 1 tablet by mouth Daily.     Dispense:  30 tablet     Refill:  4    Adalimumab-adaz (Hyrimoz) 40 MG/0.4ML solution auto-injector     Sig: Inject 40 mg under the skin into the appropriate area as directed Every 14 (Fourteen) Days. Need an appointment for further refills     Dispense:  0.8 mL     Refill:  2           Follow Up:   Return in about 4 months (around 7/31/2025).      Darrick Baron DO  [facility] [place]

## 2025-03-31 NOTE — ASSESSMENT & PLAN NOTE
* Medications/treatments/interventions tried include: Meloxicam, prednisone taper, Flexeril PRN, Arava, adalimumab     1. Check labs   2. He has been without adalimumab for a couple of months. My support staff informs me today that his insurance now prefers a Biolsimilar version of adalimumab rather than name brand Humira. We will make this change as instructed. Risks and benefits reviewed   3. Continue refill meloxicam and Arava   4. We gave him a handout on psoriasis to take home and review   5. Follow up in 4 months

## 2025-03-31 NOTE — ASSESSMENT & PLAN NOTE
* Arava 20 mg/day for psoriatic arthritis     1. CBC and CMP every 8-12 weeks to monitor for toxicity.   2. Check labs.   3. No recent serious infections

## 2025-04-01 RX ORDER — SEMAGLUTIDE 2.68 MG/ML
INJECTION, SOLUTION SUBCUTANEOUS
Qty: 3 ML | Refills: 2 | Status: SHIPPED | OUTPATIENT
Start: 2025-04-01

## 2025-04-03 RX ORDER — IMIQUIMOD 12.5 MG/.25G
1 CREAM TOPICAL NIGHTLY
COMMUNITY

## 2025-04-04 ENCOUNTER — OFFICE VISIT (OUTPATIENT)
Dept: FAMILY MEDICINE CLINIC | Facility: CLINIC | Age: 55
End: 2025-04-04
Payer: COMMERCIAL

## 2025-04-04 VITALS
SYSTOLIC BLOOD PRESSURE: 134 MMHG | OXYGEN SATURATION: 98 % | TEMPERATURE: 98.6 F | HEIGHT: 68 IN | BODY MASS INDEX: 36.74 KG/M2 | DIASTOLIC BLOOD PRESSURE: 77 MMHG | HEART RATE: 67 BPM | WEIGHT: 242.4 LBS

## 2025-04-04 DIAGNOSIS — L40.50 PSORIATIC ARTHRITIS: ICD-10-CM

## 2025-04-04 DIAGNOSIS — M15.0 PRIMARY OSTEOARTHRITIS INVOLVING MULTIPLE JOINTS: Chronic | ICD-10-CM

## 2025-04-04 DIAGNOSIS — E66.9 MODERATE OBESITY: ICD-10-CM

## 2025-04-04 DIAGNOSIS — D07.4 SQUAMOUS CELL CARCINOMA IN SITU (SCCIS) OF GLANS PENIS: ICD-10-CM

## 2025-04-04 DIAGNOSIS — Z12.11 SCREEN FOR COLON CANCER: ICD-10-CM

## 2025-04-04 DIAGNOSIS — H90.3 SENSORINEURAL HEARING LOSS (SNHL) OF BOTH EARS: ICD-10-CM

## 2025-04-04 DIAGNOSIS — E78.5 DYSLIPIDEMIA: ICD-10-CM

## 2025-04-04 DIAGNOSIS — K21.00 GASTROESOPHAGEAL REFLUX DISEASE WITH ESOPHAGITIS, UNSPECIFIED WHETHER HEMORRHAGE: ICD-10-CM

## 2025-04-04 DIAGNOSIS — E55.9 VITAMIN D DEFICIENCY: ICD-10-CM

## 2025-04-04 DIAGNOSIS — E11.29 TYPE 2 DIABETES MELLITUS WITH OTHER DIABETIC KIDNEY COMPLICATION: ICD-10-CM

## 2025-04-04 DIAGNOSIS — Z00.01 ENCOUNTER FOR GENERAL ADULT MEDICAL EXAMINATION WITH ABNORMAL FINDINGS: Primary | ICD-10-CM

## 2025-04-04 DIAGNOSIS — Z12.5 SCREENING FOR PROSTATE CANCER: ICD-10-CM

## 2025-04-04 DIAGNOSIS — I49.8 ATRIAL BIGEMINY: ICD-10-CM

## 2025-04-04 DIAGNOSIS — I10 PRIMARY HYPERTENSION: ICD-10-CM

## 2025-04-04 DIAGNOSIS — K75.81 STEATOHEPATITIS: ICD-10-CM

## 2025-04-04 DIAGNOSIS — E03.8 SUBCLINICAL HYPOTHYROIDISM: ICD-10-CM

## 2025-04-04 PROBLEM — E53.8 DEFICIENCY OF OTHER SPECIFIED B GROUP VITAMINS: Status: RESOLVED | Noted: 2022-10-05 | Resolved: 2025-04-04

## 2025-04-04 LAB
EXPIRATION DATE: ABNORMAL
EXPIRATION DATE: ABNORMAL
EXPIRATION DATE: NORMAL
GLUCOSE BLDC GLUCOMTR-MCNC: 134 MG/DL (ref 70–130)
HBA1C MFR BLD: 6.3 % (ref 4.5–5.7)
Lab: ABNORMAL
Lab: ABNORMAL
Lab: NORMAL
POC ALBUMIN, URINE: 10 MG/L
POC CREATININE, URINE: 200 MG/DL
POC URINE ALB/CREA RATIO: <30

## 2025-04-04 RX ORDER — FLUOROURACIL 50 MG/G
CREAM TOPICAL
COMMUNITY
Start: 2025-03-13

## 2025-04-04 RX ORDER — ADALIMUMAB 40MG/0.4ML
KIT SUBCUTANEOUS
COMMUNITY
End: 2025-04-04

## 2025-04-04 RX ORDER — OMEPRAZOLE 40 MG/1
40 CAPSULE, DELAYED RELEASE ORAL DAILY
Qty: 90 CAPSULE | Refills: 3 | Status: SHIPPED | OUTPATIENT
Start: 2025-04-04

## 2025-04-04 RX ORDER — TAMSULOSIN HYDROCHLORIDE 0.4 MG/1
CAPSULE ORAL
COMMUNITY
Start: 2024-12-23

## 2025-04-04 NOTE — ASSESSMENT & PLAN NOTE
Previously noted atrial bigeminy in 2019, more recent EKGs including today revealing sinus arrhythmia and chronic stable lateral T wave abnormality with history of normal stress echocardiogram in 1/2024 and echocardiogram 12/2023 revealing EF of 55% with moderate concentric LVH and mild MR.  Asymptomatic.

## 2025-04-04 NOTE — ASSESSMENT & PLAN NOTE
Plan 54-year-old male presenting for complete physical with health issues addressed as detailed below, health maintenance includes referral pending for screening colonoscopy, recommend update Shingrix vaccine through pharmacy with patient declining COVID-19 booster today, EKG stable, laboratory testing obtained on 3/31/2025 through his rheumatologist with supplemental lab testing to be ordered today, patient to return at a future date to obtain.  Follow-up in 6 months to review his diabetes mellitus type 2 with another hemoglobin A1c, and as needed in the interim

## 2025-04-04 NOTE — ASSESSMENT & PLAN NOTE
Followed regularly by rheumatology, most recently on 3/31/2025 with appropriate labs stable, continuing on meloxicam, however months, and Arava.  Pertinent labs unremarkable from 3/31/2025

## 2025-04-04 NOTE — ASSESSMENT & PLAN NOTE
EKG today reveals a stable sinus arrhythmia with lateral T wave changesSatisfactory blood pressure control acutely with patient not monitoring blood pressures at home on a chronic basis.  Advised to do so with ideal parameters and proper technique discussed.

## 2025-04-04 NOTE — ASSESSMENT & PLAN NOTE
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  Discussed need to prove his diet with more fruit and vegetable intake, reduce his fast food intake, and increase his exercise,

## 2025-04-04 NOTE — ASSESSMENT & PLAN NOTE
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  Surgical excision site appears to be healing well

## 2025-04-04 NOTE — ASSESSMENT & PLAN NOTE
Has degenerative changes in his neck, also has arthritis in his hands though the latter appears to be more related to psoriatic arthritis.  He is followed regularly by rheumatology, currently taking meloxicam for arthritic pain, and also Hyrimoz and Arava for his psoriatic arthritis

## 2025-04-04 NOTE — PROGRESS NOTES
Male Physical Note      Date: 2025   Patient Name: Derek Hernandez  : 1970   MRN: [id number]     Chief Complaint:    Chief Complaint   Patient presents with    Annual Exam       History of Present Illness: Derek Hernandez is a 54 y.o. male who is here today for their annual health maintenance and physical.  Patient really has no acute concerns at this time.  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  History of atrial bigeminy with subsequent EKGs revealing sinus arrhythmia, stable today, normal stress echocardiogram in 2024 with preceding echocardiogram 2023 revealing EF 55% with moderate concentric LVH and mild MVR, no chest pains palpitations dyspnea dizziness or edema.  Diabetic on Ozempic 2 mg weekly metformin  mg prescribed 2 tablets daily but patient in fact apparently taking 1 daily blood sugars averaging 100-1 05 fasting checked twice weekly, with hemoglobin A1c today 6.3% unchanged from 2024.  Fair diet, though still eats fast food 3-4 times weekly, limited physical activity, hypertension on losartan rarely checking blood pressures, hyperlipidemia taking atorvastatin and Zetia, obesity status post Kristine-en-Y gastric bypass procedure in 2012, currently benefiting from Ozempic as noted prescribed for diabetes, psoriatic arthritis involving his hands currently on Hyrimoz Arava and meloxicam with good control of symptoms followed by rheumatology, BPH on Flomax, dysuria hematuria frequency or incontinence, nocturia x 0-1, GERD generally controlled at this time not regularly taking omeprazole, subclinical hypothyroidism, prior TAMEKA intolerant of PAP, indicating he sleeps well.  Ex-cigarette smoker abstaining greater than 25 years prior, 6 to 7 years ago started using smokeless tobacco pouches.   Review of systems with no chest pains palpitations dyspnea dizziness or edema, no GI, other , other musculoskeletal or neurological concerns, moods doing well.  Health maintenance includes limited lab testing from rheumatology on 3/31/2025 including CBC CMP ESR and CRP all satisfactory,, declines COVID-19 vaccine booster, due for Shingrix, EKG today revealing sinus arrhythmia with chronic T wave inversion in leads I and aVL with lateral T wave flattening, stable with normal stress testing as noted.      Subjective      Review of Systems:   Review of Systems    Past Medical History, Social History, Family History and Care Team were all reviewed with patient and updated as appropriate.     Medications:     Current Outpatient Medications:     acyclovir (ZOVIRAX) 5 % ointment, Apply 1 Application topically to the appropriate area as directed 3 times a day., Disp: , Rfl:     Adalimumab-adaz (Hyrimoz) 40 MG/0.4ML solution auto-injector, Inject 40 mg under the skin into the appropriate area as directed Every 14 (Fourteen) Days. Need an appointment for further refills, Disp: 0.8 mL, Rfl: 2    atorvastatin (LIPITOR) 80 MG tablet, TAKE 1 TABLET BY MOUTH AT BEDTIME, Disp: 90 tablet, Rfl: 2    Cholecalciferol 25 MCG (1000 UT) tablet, Take 1 tablet by mouth Daily., Disp: , Rfl:     ezetimibe (ZETIA) 10 MG tablet, TAKE 1 TABLET BY MOUTH EVERY DAY, Disp: 90 tablet, Rfl: 2    fluorouracil (EFUDEX) 5 % cream, , Disp: , Rfl:     imiquimod (ALDARA) 5 % cream, Apply 1 Application topically to the appropriate area as directed Every Night., Disp: , Rfl:     leflunomide (ARAVA) 20 MG tablet, Take 1 tablet by mouth Daily., Disp: 30 tablet, Rfl: 4    losartan (COZAAR) 50 MG tablet, TAKE 1 TABLET BY MOUTH ONCE DAILY, Disp: 90 tablet, Rfl: 2    meloxicam (MOBIC) 15 MG tablet, Take 1 tablet by mouth Daily., Disp: 30 tablet, Rfl: 4    metFORMIN ER (GLUCOPHAGE-XR) 500 MG 24 hr tablet, 2 tablets with evening meal, Disp: 180 tablet, Rfl: 3     naloxone (NARCAN) 4 MG/0.1ML nasal spray, Administer 1 spray into the nostril(s) as directed by provider As Needed., Disp: , Rfl:     omeprazole (priLOSEC) 40 MG capsule, Take 1 capsule by mouth Daily., Disp: 90 capsule, Rfl: 3    Ozempic, 2 MG/DOSE, 8 MG/3ML solution pen-injector, INJECT 2 MG UNDER THE SKIN INTO THE APPROPRIATE AREA AS DIRECTED ONCE WEEKLY, Disp: 3 mL, Rfl: 2    tamsulosin (FLOMAX) 0.4 MG capsule 24 hr capsule, , Disp: , Rfl:     Allergies:   No Known Allergies    Immunizations:  Health Maintenance Summary            Current Care Gaps       DIABETIC EYE EXAM (Yearly) Overdue since 6/1/2024 06/01/2023  Patient-Reported (Performed Externally) - reportedly unremarkable              Hepatitis B (1 of 3 - 19+ 3-dose series) Never done     No completion, postpone, or frequency change history exists for this topic.              ZOSTER VACCINE (1 of 2) Never done     No completion, postpone, or frequency change history exists for this topic.                      Awaiting Completion       COLORECTAL CANCER SCREENING (View Topic Details) Order placed this encounter     No completion, postpone, or frequency change history exists for this topic.                      Upcoming       COVID-19 Vaccine (1) Postponed until 10/4/2025      04/04/2025  Postponed until 10/4/2025 by Vicky Lux (Patient Refused - refused)    04/19/2024  Postponed until 12/31/2024 by Vicky Lux (Patient Refused - refused)              INFLUENZA VACCINE (Yearly - July to March) Next due on 7/1/2025 11/22/2023  Imm Admin: Fluzone (or Fluarix & Flulaval for VFC) >6mos    10/30/2022  Imm Admin: Fluzone (or Fluarix & Flulaval for VFC) >6mos    12/01/2020  Imm Admin: Fluzone (or Fluarix & Flulaval for VFC) >6mos    12/01/2020  Imm Admin: Flu Vaccine Quad PF >36MO    11/03/2019  Imm Admin: Fluzone (or Fluarix & Flulaval for VFC) >6mos     Only the first 5 history entries have been loaded, but more history exists.             HEMOGLOBIN A1C (Every 6 Months) Next due on 10/4/2025      04/04/2025  Hemoglobin A1C component of POC Glycosylated Hemoglobin (Hb A1C)    04/19/2024  Hemoglobin A1C component of POC Glycosylated Hemoglobin (Hb A1C)    11/22/2023  Hemoglobin A1C component of POC Glycosylated Hemoglobin (Hb A1C)    09/07/2023  Hemoglobin A1C component of POC Glycosylated Hemoglobin (Hb A1C)    10/06/2022  Hemoglobin A1C component of Hemoglobin A1c      Only the first 5 history entries have been loaded, but more history exists.              ANNUAL PHYSICAL (Yearly) Next due on 4/4/2026 04/04/2025  Done    09/07/2023  Done              DIABETIC FOOT EXAM (Yearly) Next due on 4/4/2026 04/04/2025  SmartData: WORKFLOW - DIABETES - DIABETIC FOOT EXAM PERFORMED    04/04/2025  SmartData: FINDINGS - TESTS - NEUROLOGY - MONOFILAMENT TEST - MONOFILAMENT TEST PERFORMED    04/04/2025  SmartData: WORKFLOW - DIABETES - DIABETIC FOOT EXAM PERFORMED    04/19/2024  SmartData: FINDINGS - TESTS - NEUROLOGY - MONOFILAMENT TEST - MONOFILAMENT TEST PERFORMED    04/19/2024  SmartData: WORKFLOW - DIABETES - DIABETIC FOOT EXAM PERFORMED      Only the first 5 history entries have been loaded, but more history exists.              URINE MICROALBUMIN-CREATININE RATIO (uACR) (Yearly) Next due on 4/4/2026 04/04/2025  POC Albumin/Creatinine Ratio Urine    07/08/2022  Done              TDAP/TD VACCINES (3 - Td or Tdap) Next due on 11/20/2028 11/20/2018  Imm Admin: Tdap    06/16/2014  Imm Admin: Tdap                      Completed or No Longer Recommended       HEPATITIS C SCREENING  Completed      05/09/2024  Hepatitis Panel, Acute    01/25/2023  Done - Negative              Pneumococcal Vaccine 50+ (Series Information) Completed      09/07/2023  Imm Admin: Pneumococcal Conjugate 20-Valent (PCV20)                            No orders of the defined types were placed in this encounter.      Colorectal Screening:   Referral for colonoscopy  "pending  Last Completed Colonoscopy    This patient has no relevant Health Maintenance data.       CT for Smoker (Age 50-80, 20pk yr within last 15 years): N/A  Bone Density/DEXA (high risk): N/A  Hep C (Age 18-79 once): Previously negative  HIV (Age 15-65 once) : N/A  PSA (Over age 50, C Level Recommendation): Pending  US Aorta (For male smokers, age 65): N/A  A1c:   Hemoglobin A1C   Date Value Ref Range Status   04/04/2025 6.3 (A) 4.5 - 5.7 % Final   07/08/2022 7.2  Final     Lipid panel: No results found for: \"LABLIPI\"    The 10-year ASCVD risk score (Nasir CANO, et al., 2019) is: 14%    Values used to calculate the score:      Age: 54 years      Sex: Male      Is Non- : No      Diabetic: Yes      Tobacco smoker: No      Systolic Blood Pressure: 134 mmHg      Is BP treated: Yes      HDL Cholesterol: 44 mg/dL      Total Cholesterol: 177 mg/dL    Dermatology: Periodic checkups pursued  Ophthalmologist: Regular checkups pursued  Dentist: Regular checkups pursued    Tobacco Use: Medium Risk (4/4/2025)    Patient History     Smoking Tobacco Use: Former     Smokeless Tobacco Use: Never     Passive Exposure: Past       Social History     Substance and Sexual Activity   Alcohol Use Not Currently        Social History     Substance and Sexual Activity   Drug Use Yes    Types: Marijuana    Comment: multiple remote marijuana usage greater 20 years prior, 2 children, no limitation of ADLs        Diet/Physical activity: Fair diet, limited physical activity    Sexual health: Denies concern, contraception not required    PHQ-2 Depression Screening  PHQ-9 Total Score:   0       Objective     Physical Exam:  Vital Signs:   Vitals:    04/04/25 1419   BP: 134/77   BP Location: Left arm   Patient Position: Sitting   Cuff Size: Adult   Pulse: 67   Temp: 98.6 °F (37 °C)   TempSrc: Temporal   SpO2: 98%   Weight: 110 kg (242 lb 6.4 oz)   Height: 172.7 cm (68\")   PainSc: 0-No pain     Facility age limit for growth " %rosanna is 20 years.  Body mass index is 36.86 kg/m².     Physical Exam  Vitals and nursing note reviewed.   Constitutional:       General: He is not in acute distress.     Appearance: Normal appearance. He is obese. He is not ill-appearing, toxic-appearing or diaphoretic.      Comments: Healthy, NAD, alert and oriented, BMI 36.8 with weight effectively unchanged over the last year   HENT:      Head: Normocephalic and atraumatic.      Right Ear: Tympanic membrane, ear canal and external ear normal.      Left Ear: Tympanic membrane, ear canal and external ear normal.      Nose: Nose normal. No congestion or rhinorrhea.      Mouth/Throat:      Mouth: Mucous membranes are moist.      Pharynx: Oropharynx is clear.      Comments: Good dentition  Eyes:      Extraocular Movements: Extraocular movements intact.      Conjunctiva/sclera: Conjunctivae normal.      Pupils: Pupils are equal, round, and reactive to light.   Neck:      Vascular: No carotid bruit.      Comments: Mild decreased range of motion primarily to rotation and extension, no periclavicular or axillary or inguinal adenopathy  Cardiovascular:      Rate and Rhythm: Normal rate and regular rhythm.      Pulses: Normal pulses.      Heart sounds: Normal heart sounds. No murmur heard.     No friction rub. No gallop.      Comments: 2+ carotids without bruits, 2+ radial pulses, 2+ femoral pulses without bruits, 2+ bipedal pulses with good perfusion and no dependent edema  Pulmonary:      Effort: Pulmonary effort is normal. No respiratory distress.      Breath sounds: Normal breath sounds. No stridor. No wheezing, rhonchi or rales.   Chest:      Chest wall: No tenderness.   Abdominal:      General: Bowel sounds are normal. There is no distension.      Palpations: Abdomen is soft. There is no mass.      Tenderness: There is no abdominal tenderness. There is no guarding or rebound.      Hernia: No hernia is present.   Genitourinary:     Comments: Normal circumcised male  with approximate 1.5 cm diameter recent surgical excision site on the right glans penis healing well,, testes descended bilaterally with no nodules or tenderness, no inguinal herniation or adenopathy rectal exam was small noninflamed external hemorrhoids, normal sphincter tone, no rectal masses or pain, prostate mildly enlarged smooth and nontender with no focal nodules or tenderness  Musculoskeletal:         General: No swelling, tenderness, deformity or signs of injury. Normal range of motion.      Cervical back: Rigidity present. No tenderness.      Right lower leg: No edema.      Left lower leg: No edema.      Comments: No current swelling or tenderness of his hands, slight decreased range of motion of his MCP joints bilaterally   Lymphadenopathy:      Cervical: No cervical adenopathy.   Skin:     General: Skin is warm and dry.      Capillary Refill: Capillary refill takes less than 2 seconds.      Findings: No lesion or rash.      Comments: Nondeformed onychomycosis of the right great toenail   Neurological:      General: No focal deficit present.      Mental Status: He is alert and oriented to person, place, and time. Mental status is at baseline.      Cranial Nerves: No cranial nerve deficit.      Sensory: No sensory deficit.      Motor: No weakness.      Coordination: Coordination normal.      Gait: Gait normal.      Comments: Bipedal exam with 2+ pulses, normal sensation both feet to fine touch vibration pinprick, motor exam normal, no edema   Psychiatric:         Mood and Affect: Mood normal.         Behavior: Behavior normal.         Thought Content: Thought content normal.         Judgment: Judgment normal.      Diabetic Foot Exam Performed and Monofilament Test Performed     POCT Results (if applicable):   Results for orders placed or performed in visit on 04/04/25   POC Glucose, Blood    Collection Time: 04/04/25  2:49 PM    Specimen: Blood   Result Value Ref Range    Glucose 134 (A) 70 - 130 mg/dL     Lot Number [id number]     Expiration Date 08/30/2025    POC Glycosylated Hemoglobin (Hb A1C)    Collection Time: 04/04/25  2:49 PM    Specimen: Blood   Result Value Ref Range    Hemoglobin A1C 6.3 (A) 4.5 - 5.7 %    Lot Number [id number]     Expiration Date 12/023/2026    POC Albumin/Creatinine Ratio Urine    Collection Time: 04/04/25  2:50 PM    Specimen: Urine   Result Value Ref Range    POC ALBUMIN, URINE 10 mg/L    POC CREATININE, URINE 200 mg/dL    POC Urine Albumin Creatinine Ratio <30 <30    Lot Number [id number]     Expiration Date 08/09/2026          ECG 12 Lead    Date/Time: 4/4/2025 5:35 PM  Performed by: Yahir Moore MD    Authorized by: Yahir Moore MD  Comparison: compared with previous ECG from 9/7/2023  Similar to previous ECG  Comparison to previous ECG: Sinus arrhythmia rate 72 with a chronic lateral T wave abnormality, unchanged versus previous EKG          Assessment / Plan      Assessment/Plan:   Diagnoses and all orders for this visit:    1. Encounter for general adult medical examination with abnormal findings (Primary)  Assessment & Plan:  Plan 54-year-old male presenting for complete physical with health issues addressed as detailed below, health maintenance includes referral pending for screening colonoscopy, recommend update Shingrix vaccine through pharmacy with patient declining COVID-19 booster today, EKG stable, laboratory testing obtained on 3/31/2025 through his rheumatologist with supplemental lab testing to be ordered today, patient to return at a future date to obtain.  Follow-up in 6 months to review his diabetes mellitus type 2 with another hemoglobin A1c, and as needed in the interim    Orders:  -     TSH; Future  -     T4, Free; Future  -     Lipid Panel; Future  -     PSA Screen; Future  -     Vitamin D,25-Hydroxy; Future  -     Urinalysis With Culture If Indicated -; Future    2. Atrial bigeminy  Assessment & Plan:  Previously noted atrial bigeminy in 2019, more  recent EKGs including today revealing sinus arrhythmia and chronic stable lateral T wave abnormality with history of normal stress echocardiogram in 1/2024 and echocardiogram 12/2023 revealing EF of 55% with moderate concentric LVH and mild MR.  Asymptomatic.    Orders:  -     ECG 12 Lead    3. Type 2 diabetes mellitus with other diabetic kidney complication  Assessment & Plan:  Hemoglobin A1c today stable at 6.3% versus 12 months prior, UACR less than 30, normal, taking Ozempic 2 mg subcu weekly, and metformin  mg prescribed 2 tablets daily but per patient account taking once daily.  Continue current regimen.  He does also take losartan 50 mg daily for benefit of his hypertension and also renal protection.  Discussed needing to improve his diet and exercise.  Patient indicates he is up-to-date with diabetic eye evaluations.  Follow-up in 6 months for repeat hemoglobin A1c.    Orders:  -     POC Glucose, Blood  -     POC Albumin/Creatinine Ratio Urine  -     POC Glycosylated Hemoglobin (Hb A1C)    4. Primary hypertension  Assessment & Plan:  EKG today reveals a stable sinus arrhythmia with lateral T wave changesSatisfactory blood pressure control acutely with patient not monitoring blood pressures at home on a chronic basis.  Advised to do so with ideal parameters and proper technique discussed.    Orders:  -     ECG 12 Lead  -     Urinalysis With Culture If Indicated -; Future    5. Dyslipidemia  Assessment & Plan:  Prescribed atorvastatin 80 mg daily and Zetia 10 mg daily.  Update lipid profile.    Orders:  -     Lipid Panel; Future    6. Vitamin D deficiency  Assessment & Plan:  Not currently on supplement.  Update level    Orders:  -     Vitamin D,25-Hydroxy; Future    7. Moderate obesity  Assessment & Plan:  Status post Kristine-en-Y gastric bypass procedure in August 2012, subsequent 100 pound weight loss maintenance, currently also getting potential benefit with Ozempic prescription currently take 2 mg  subcu weekly prescribed for his diabetes, with weight stable over the last year.  Discussed need to prove his diet with more fruit and vegetable intake, reduce his fast food intake, and increase his exercise,       8. Subclinical hypothyroidism  Assessment & Plan:  TSH and free T4, noting not currently on thyroid replacement.    Orders:  -     TSH; Future  -     T4, Free; Future    9. Gastroesophageal reflux disease with esophagitis, unspecified whether hemorrhage  Assessment & Plan:  Relates he is currently asymptomatic, prescribed omeprazole 40 mg daily not taking regularly.  Advised to use only as needed    Orders:  -     omeprazole (priLOSEC) 40 MG capsule; Take 1 capsule by mouth Daily.  Dispense: 90 capsule; Refill: 3    10. Steatohepatitis  Assessment & Plan:  Status post prior diagnosis of steatohepatitis.  Most recent liver function testing on 3/31/2025 normal.  Pursue healthy lifestyle with diet and exercise along with ongoing efforts at weight loss, status post prior Kristine-en-Y gastric procedure and also currently on Ozempic.      11. Primary osteoarthritis involving multiple joints  Assessment & Plan:  Has degenerative changes in his neck, also has arthritis in his hands though the latter appears to be more related to psoriatic arthritis.  He is followed regularly by rheumatology, currently taking meloxicam for arthritic pain, and also Hyrimoz and Arava for his psoriatic arthritis      12. Psoriatic arthritis  Assessment & Plan:  Followed regularly by rheumatology, most recently on 3/31/2025 with appropriate labs stable, continuing on meloxicam, however months, and Arava.  Pertinent labs unremarkable from 3/31/2025      13. Sensorineural hearing loss (SNHL) of both ears  Assessment & Plan:  Wears bilateral hearing aids      14. 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  Surgical excision site appears to be healing well      15. Screening for prostate cancer  Assessment & Plan:  Check PSA    Orders:  -     PSA Screen; Future    16. Screen for colon cancer  Assessment & Plan:  Refer for screening colonoscopy, noting average risk    Orders:  -     Ambulatory Referral For Screening Colonoscopy         Healthcare Maintenance:  Counseling provided based on age appropriate USPSTF guidelines.       Derek Hernandez voices understanding and acceptance of this advice and will call back with any further questions or concerns. AVS with preventive healthcare tips printed for patient.     Follow Up:   Return in about 6 months (around 10/4/2025) for Recheck.    At [facility], we believe that sharing information builds trust and better relationships. You are receiving this note because you recently visited [facility]. It is possible you will see health information before a provider has talked with you about it. This kind of information can be easy to misunderstand. To help you fully understand what it means for your health, we urge you to discuss this note with your provider.    Yahir Moore MD  [facility] [place] [facility]

## 2025-04-04 NOTE — ASSESSMENT & PLAN NOTE
Hemoglobin A1c today stable at 6.3% versus 12 months prior, UACR less than 30, normal, taking Ozempic 2 mg subcu weekly, and metformin  mg prescribed 2 tablets daily but per patient account taking once daily.  Continue current regimen.  He does also take losartan 50 mg daily for benefit of his hypertension and also renal protection.  Discussed needing to improve his diet and exercise.  Patient indicates he is up-to-date with diabetic eye evaluations.  Follow-up in 6 months for repeat hemoglobin A1c.

## 2025-04-04 NOTE — ASSESSMENT & PLAN NOTE
Relates he is currently asymptomatic, prescribed omeprazole 40 mg daily not taking regularly.  Advised to use only as needed

## 2025-04-04 NOTE — ASSESSMENT & PLAN NOTE
Status post prior diagnosis of steatohepatitis.  Most recent liver function testing on 3/31/2025 normal.  Pursue healthy lifestyle with diet and exercise along with ongoing efforts at weight loss, status post prior Kristine-en-Y gastric procedure and also currently on Ozempic.

## 2025-04-29 ENCOUNTER — TELEPHONE (OUTPATIENT)
Age: 55
End: 2025-04-29
Payer: COMMERCIAL

## 2025-04-29 NOTE — TELEPHONE ENCOUNTER
Prior authorization initiated by [facility].  Update will be provided when a determination has been received.     Medication: adalimumab-adaz  PA Submission Method: CMM  Case Number/CMM Key: [id number]

## 2025-04-29 NOTE — TELEPHONE ENCOUNTER
PA request has been approved and pharmacy notified (Filling pharmacy will be notified by phone, fax, or submitted prescription)    Authorized Medication: adalimumab-adaz  Name of Insurance Approving PA: [facility]  PA Effective Dates: 4.29.25-4.29.26  Dispensing Pharmacy: [facility]

## 2025-05-05 DIAGNOSIS — E78.5 DYSLIPIDEMIA: ICD-10-CM

## 2025-05-05 RX ORDER — EZETIMIBE 10 MG/1
10 TABLET ORAL DAILY
Qty: 90 TABLET | Refills: 1 | Status: SHIPPED | OUTPATIENT
Start: 2025-05-05

## 2025-06-24 ENCOUNTER — SPECIALTY PHARMACY (OUTPATIENT)
Age: 55
End: 2025-06-24
Payer: COMMERCIAL

## 2025-06-25 RX ORDER — ADALIMUMAB-ADAZ 40 MG/.4ML
INJECTION, SOLUTION SUBCUTANEOUS
Refills: 2 | OUTPATIENT
Start: 2025-06-25

## 2025-06-30 ENCOUNTER — SPECIALTY PHARMACY (OUTPATIENT)
Facility: HOSPITAL | Age: 55
End: 2025-06-30
Payer: COMMERCIAL

## 2025-06-30 RX ORDER — ADALIMUMAB-ADAZ 40 MG/.4ML
40 INJECTION, SOLUTION SUBCUTANEOUS
Qty: 0.8 ML | Refills: 2 | Status: SHIPPED | OUTPATIENT
Start: 2025-06-30
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
## 2025-06-30 NOTE — PROGRESS NOTES
Specialty Pharmacy Patient Management Program  Per Protocol Prescription Order/Refill     Patient currently fills medications at [facility] and is enrolled in an Rheumatology Patient Management Program.     Requested Prescriptions     Signed Prescriptions Disp Refills    Adalimumab-adaz (Hyrimoz) 40 MG/0.4ML solution auto-injector 0.8 mL 2     Sig: Inject 40 mg under the skin into the appropriate area as directed Every 14 (Fourteen) Days.     Prescription orders above were sent to the pharmacy per Collaborative Care Agreement Protocol.

## 2025-07-23 ENCOUNTER — SPECIALTY PHARMACY (OUTPATIENT)
Age: 55
End: 2025-07-23
Payer: COMMERCIAL

## 2025-07-23 NOTE — PROGRESS NOTES
"   Specialty Pharmacy Patient Management Program  Refill Outreach     Derek \"Mckinley\" was contacted today regarding refills of their medication(s).    Refill Questions      Flowsheet Row Most Recent Value   Changes to allergies? No   Changes to medications? No   New conditions or infections since last clinic visit No   Unplanned office visit, urgent care, ED, or hospital admission in the last 4 weeks  No   How does patient/caregiver feel medication is working? Fair  [better results when he was using the weekly dose]   Financial problems or insurance changes  No   Since the previous refill, were any specialty medication doses or scheduled injections missed or delayed?  No   Does this patient require a clinical escalation to a pharmacist? No            Delivery Questions      Flowsheet Row Most Recent Value   Delivery method [employer]   Delivery address verified with patient/caregiver? Yes   Delivery address Home   Number of medications in delivery 1   Medication(s) being filled and delivered Adalimumab-adaz   Doses left of specialty medications 1   Copay verified? Yes   Copay amount 0   Copay form of payment No copayment ($0)   Delivery Date Selection 07/24/25   Signature Required No   Do you consent to receive electronic handouts?  Yes                 Follow-up: 21 day(s)     Rachel Merritt, Pharmacy Technician  7/23/2025  09:32 EDT    "

## 2025-08-01 DIAGNOSIS — E11.29 TYPE 2 DIABETES MELLITUS WITH OTHER DIABETIC KIDNEY COMPLICATION: ICD-10-CM

## 2025-08-01 RX ORDER — SEMAGLUTIDE 2.68 MG/ML
INJECTION, SOLUTION SUBCUTANEOUS
Qty: 3 ML | Refills: 1 | Status: SHIPPED | OUTPATIENT
Start: 2025-08-01

## 2025-08-08 ENCOUNTER — OFFICE VISIT (OUTPATIENT)
Age: 55
End: 2025-08-08
Payer: COMMERCIAL

## 2025-08-08 ENCOUNTER — TELEPHONE (OUTPATIENT)
Age: 55
End: 2025-08-08

## 2025-08-08 VITALS
TEMPERATURE: 97.5 F | HEIGHT: 68 IN | HEART RATE: 87 BPM | BODY MASS INDEX: 36.22 KG/M2 | WEIGHT: 239 LBS | DIASTOLIC BLOOD PRESSURE: 74 MMHG | SYSTOLIC BLOOD PRESSURE: 140 MMHG

## 2025-08-08 DIAGNOSIS — Z79.1 NSAID LONG-TERM USE: ICD-10-CM

## 2025-08-08 DIAGNOSIS — R76.12 POSITIVE QUANTIFERON-TB GOLD TEST: ICD-10-CM

## 2025-08-08 DIAGNOSIS — L40.9 PSORIASIS: ICD-10-CM

## 2025-08-08 DIAGNOSIS — R53.83 FATIGUE, UNSPECIFIED TYPE: ICD-10-CM

## 2025-08-08 DIAGNOSIS — D84.821 IMMUNODEFICIENCY DUE TO DRUG THERAPY: ICD-10-CM

## 2025-08-08 DIAGNOSIS — Z51.81 ENCOUNTER FOR MONITORING LEFLUNOMIDE THERAPY: ICD-10-CM

## 2025-08-08 DIAGNOSIS — Z79.899 IMMUNODEFICIENCY DUE TO DRUG THERAPY: ICD-10-CM

## 2025-08-08 DIAGNOSIS — L40.50 PSORIATIC ARTHRITIS: Primary | ICD-10-CM

## 2025-08-08 DIAGNOSIS — Z79.69 ENCOUNTER FOR MONITORING LEFLUNOMIDE THERAPY: ICD-10-CM

## 2025-08-08 DIAGNOSIS — M15.0 PRIMARY OSTEOARTHRITIS INVOLVING MULTIPLE JOINTS: ICD-10-CM

## 2025-08-11 ENCOUNTER — TELEPHONE (OUTPATIENT)
Age: 55
End: 2025-08-11
Payer: COMMERCIAL

## 2025-08-11 ENCOUNTER — SPECIALTY PHARMACY (OUTPATIENT)
Age: 55
End: 2025-08-11
Payer: COMMERCIAL

## 2025-08-11 ENCOUNTER — SPECIALTY PHARMACY (OUTPATIENT)
Dept: GENERAL RADIOLOGY | Facility: HOSPITAL | Age: 55
End: 2025-08-11
Payer: COMMERCIAL

## 2025-08-11 RX ORDER — ADALIMUMAB-ADAZ 40 MG/.4ML
40 INJECTION, SOLUTION SUBCUTANEOUS
Qty: 1.6 ML | Refills: 3 | Status: SHIPPED | OUTPATIENT
Start: 2025-08-11

## 2025-08-12 ENCOUNTER — SPECIALTY PHARMACY (OUTPATIENT)
Age: 55
End: 2025-08-12
Payer: COMMERCIAL

## 2025-08-12 LAB
ALBUMIN SERPL-MCNC: 4.3 G/DL (ref 3.8–4.9)
ALP SERPL-CCNC: 62 IU/L (ref 44–121)
ALT SERPL-CCNC: 20 IU/L (ref 0–44)
AST SERPL-CCNC: 23 IU/L (ref 0–40)
BILIRUB SERPL-MCNC: 0.5 MG/DL (ref 0–1.2)
BUN SERPL-MCNC: 9 MG/DL (ref 6–24)
BUN/CREAT SERPL: 8 (ref 9–20)
CALCIUM SERPL-MCNC: 9.1 MG/DL (ref 8.7–10.2)
CHLORIDE SERPL-SCNC: 102 MMOL/L (ref 96–106)
CO2 SERPL-SCNC: 23 MMOL/L (ref 20–29)
CREAT SERPL-MCNC: 1.16 MG/DL (ref 0.76–1.27)
CRP SERPL-MCNC: <1 MG/L (ref 0–10)
EGFRCR SERPLBLD CKD-EPI 2021: 73 ML/MIN/1.73
ERYTHROCYTE [DISTWIDTH] IN BLOOD BY AUTOMATED COUNT: 13.4 % (ref 11.6–15.4)
ERYTHROCYTE [SEDIMENTATION RATE] IN BLOOD BY WESTERGREN METHOD: 7 MM/HR (ref 0–30)
GAMMA INTERFERON BACKGROUND BLD IA-ACNC: 0.02 IU/ML
GLOBULIN SER CALC-MCNC: 2.5 G/DL (ref 1.5–4.5)
GLUCOSE SERPL-MCNC: 90 MG/DL (ref 70–99)
HAV IGM SERPL QL IA: NEGATIVE
HBV CORE IGM SERPL QL IA: NEGATIVE
HBV SURFACE AG SERPL QL IA: NEGATIVE
HCT VFR BLD AUTO: 45.4 % (ref 37.5–51)
HCV AB SERPL QL IA: NON REACTIVE
HCV AB SERPL QL IA: NORMAL
HGB BLD-MCNC: 14.3 G/DL (ref 13–17.7)
M TB IFN-G BLD-IMP: POSITIVE
M TB IFN-G CD4+ BCKGRND COR BLD-ACNC: 0.48 IU/ML
M TB IFN-G CD4+CD8+ BCKGRND COR BLD-ACNC: 0.61 IU/ML
MCH RBC QN AUTO: 28 PG (ref 26.6–33)
MCHC RBC AUTO-ENTMCNC: 31.5 G/DL (ref 31.5–35.7)
MCV RBC AUTO: 89 FL (ref 79–97)
MITOGEN IGNF BCKGRD COR BLD-ACNC: >10 IU/ML
PLATELET # BLD AUTO: 234 X10E3/UL (ref 150–450)
POTASSIUM SERPL-SCNC: 4.5 MMOL/L (ref 3.5–5.2)
PROT SERPL-MCNC: 6.8 G/DL (ref 6–8.5)
QUANTIFERON INCUBATION: ABNORMAL
RBC # BLD AUTO: 5.1 X10E6/UL (ref 4.14–5.8)
SERVICE CMNT-IMP: ABNORMAL
SODIUM SERPL-SCNC: 139 MMOL/L (ref 134–144)
WBC # BLD AUTO: 5.6 X10E3/UL (ref 3.4–10.8)

## 2025-08-13 ENCOUNTER — RESULTS FOLLOW-UP (OUTPATIENT)
Age: 55
End: 2025-08-13
Payer: COMMERCIAL

## 2025-08-13 DIAGNOSIS — R53.83 FATIGUE, UNSPECIFIED TYPE: ICD-10-CM

## 2025-08-13 DIAGNOSIS — R76.12 POSITIVE QUANTIFERON-TB GOLD TEST: Primary | ICD-10-CM
